# Patient Record
Sex: FEMALE | Race: WHITE | Employment: FULL TIME | URBAN - METROPOLITAN AREA
[De-identification: names, ages, dates, MRNs, and addresses within clinical notes are randomized per-mention and may not be internally consistent; named-entity substitution may affect disease eponyms.]

---

## 2018-08-06 ENCOUNTER — OFFICE VISIT (OUTPATIENT)
Dept: OBGYN CLINIC | Facility: CLINIC | Age: 28
End: 2018-08-06
Payer: COMMERCIAL

## 2018-08-06 VITALS
SYSTOLIC BLOOD PRESSURE: 112 MMHG | DIASTOLIC BLOOD PRESSURE: 70 MMHG | WEIGHT: 181 LBS | BODY MASS INDEX: 25.34 KG/M2 | HEIGHT: 71 IN | HEART RATE: 52 BPM | TEMPERATURE: 98 F | RESPIRATION RATE: 16 BRPM

## 2018-08-06 DIAGNOSIS — Z12.4 SCREENING FOR MALIGNANT NEOPLASM OF CERVIX: ICD-10-CM

## 2018-08-06 DIAGNOSIS — Z01.419 ENCOUNTER FOR ANNUAL ROUTINE GYNECOLOGICAL EXAMINATION: Primary | ICD-10-CM

## 2018-08-06 DIAGNOSIS — Z23 NEED FOR PROPHYLACTIC VACCINATION AND INOCULATION AGAINST CHOLERA ALONE: ICD-10-CM

## 2018-08-06 PROCEDURE — 90471 IMMUNIZATION ADMIN: CPT | Performed by: OBSTETRICS & GYNECOLOGY

## 2018-08-06 PROCEDURE — 88175 CYTOPATH C/V AUTO FLUID REDO: CPT | Performed by: OBSTETRICS & GYNECOLOGY

## 2018-08-06 PROCEDURE — 99385 PREV VISIT NEW AGE 18-39: CPT | Performed by: OBSTETRICS & GYNECOLOGY

## 2018-08-06 PROCEDURE — 90649 4VHPV VACCINE 3 DOSE IM: CPT | Performed by: OBSTETRICS & GYNECOLOGY

## 2018-08-06 NOTE — PROGRESS NOTES
HPI:   Bakari Mayer is a 29year old  who presents for an annual gynecological exam.    Menses: No LMP recorded. Patient is not currently having periods (Reason: IUD - Intrauterine Device).  Cycle length:  Irregular spotting  Flow:  Light. mirena sin symmetric  THYROID: No masses, no thyromegaly  BREASTS: Normal inspection, no dominant masses on palpation, no lymphadenopathy  CV: Regular rate and rhythm  LUNGS: Clear to auscultation bilaterally, good air entry throughout  ABD: Soft, nontender and not d exam.        Normal exam.  Pap done. Contraception: IUD  RTO 1 year or PRN.     Diagnoses and all orders for this visit:    Encounter for annual routine gynecological examination    Screening for malignant neoplasm of cervix  -     THINPREP PAP WITH HPV RE

## 2018-08-16 ENCOUNTER — TELEPHONE (OUTPATIENT)
Dept: OBGYN CLINIC | Facility: CLINIC | Age: 28
End: 2018-08-16

## 2018-09-05 ENCOUNTER — TELEPHONE (OUTPATIENT)
Dept: OBGYN CLINIC | Facility: CLINIC | Age: 28
End: 2018-09-05

## 2018-09-10 ENCOUNTER — TELEPHONE (OUTPATIENT)
Dept: OBGYN CLINIC | Facility: CLINIC | Age: 28
End: 2018-09-10

## 2018-09-10 NOTE — TELEPHONE ENCOUNTER
Reach out to patient to rescheduled appointment that was cancelled for colposcopy. No answer left message.

## 2018-09-29 ENCOUNTER — OFFICE VISIT (OUTPATIENT)
Dept: OBGYN CLINIC | Facility: CLINIC | Age: 28
End: 2018-09-29
Payer: COMMERCIAL

## 2018-09-29 VITALS
SYSTOLIC BLOOD PRESSURE: 104 MMHG | WEIGHT: 178 LBS | RESPIRATION RATE: 14 BRPM | BODY MASS INDEX: 24.92 KG/M2 | HEART RATE: 68 BPM | DIASTOLIC BLOOD PRESSURE: 78 MMHG | HEIGHT: 71 IN

## 2018-09-29 DIAGNOSIS — B96.89 BV (BACTERIAL VAGINOSIS): ICD-10-CM

## 2018-09-29 DIAGNOSIS — N89.8 VAGINAL DISCHARGE: Primary | ICD-10-CM

## 2018-09-29 DIAGNOSIS — N76.0 BV (BACTERIAL VAGINOSIS): ICD-10-CM

## 2018-09-29 DIAGNOSIS — N87.0 MILD DYSPLASIA OF CERVIX: ICD-10-CM

## 2018-09-29 PROCEDURE — 87660 TRICHOMONAS VAGIN DIR PROBE: CPT | Performed by: OBSTETRICS & GYNECOLOGY

## 2018-09-29 PROCEDURE — 99213 OFFICE O/P EST LOW 20 MIN: CPT | Performed by: OBSTETRICS & GYNECOLOGY

## 2018-09-29 PROCEDURE — 87510 GARDNER VAG DNA DIR PROBE: CPT | Performed by: OBSTETRICS & GYNECOLOGY

## 2018-09-29 PROCEDURE — 87480 CANDIDA DNA DIR PROBE: CPT | Performed by: OBSTETRICS & GYNECOLOGY

## 2018-09-29 RX ORDER — METRONIDAZOLE 500 MG/1
500 TABLET ORAL 2 TIMES DAILY
Qty: 14 TABLET | Refills: 0 | Status: SHIPPED | OUTPATIENT
Start: 2018-09-29 | End: 2018-10-06

## 2018-09-29 NOTE — PROGRESS NOTES
CHIEF COMPLAINT:   Patient presents with vaginal discharge with odor  HPI:   Nicolás Palmer is a 29year old  No LMP recorded (lmp unknown). Patient is not currently having periods (Reason: IUD - Intrauterine Device).  who presents with chief comp polyps  Presence of discharge that is consistent with bacterial vaginosis  IMPRESSION/PLAN:     BV and vaginal discharge: Affirm done. We will start Flagyl 500 mg 1 p.o. twice daily for 7 days. Recurrent BV discussed with patient. Etiology discussed.   P

## 2018-10-16 ENCOUNTER — TELEPHONE (OUTPATIENT)
Dept: OBGYN CLINIC | Facility: CLINIC | Age: 28
End: 2018-10-16

## 2018-10-16 NOTE — TELEPHONE ENCOUNTER
LM for to call office and schedule 2 Gardasil injection. She should have received 2nd injection on or about 9/6/18.

## 2019-01-04 ENCOUNTER — OFFICE VISIT (OUTPATIENT)
Dept: OBGYN CLINIC | Facility: CLINIC | Age: 29
End: 2019-01-04
Payer: COMMERCIAL

## 2019-01-04 VITALS
DIASTOLIC BLOOD PRESSURE: 70 MMHG | WEIGHT: 179 LBS | BODY MASS INDEX: 25.06 KG/M2 | RESPIRATION RATE: 12 BRPM | HEART RATE: 64 BPM | SYSTOLIC BLOOD PRESSURE: 102 MMHG | HEIGHT: 71 IN | TEMPERATURE: 99 F

## 2019-01-04 DIAGNOSIS — R87.612 PAPANICOLAOU SMEAR OF CERVIX WITH LOW GRADE SQUAMOUS INTRAEPITHELIAL LESION (LGSIL): Primary | ICD-10-CM

## 2019-01-04 PROCEDURE — 88305 TISSUE EXAM BY PATHOLOGIST: CPT | Performed by: OBSTETRICS & GYNECOLOGY

## 2019-01-04 PROCEDURE — 57454 BX/CURETT OF CERVIX W/SCOPE: CPT | Performed by: OBSTETRICS & GYNECOLOGY

## 2019-01-04 NOTE — PROGRESS NOTES
COLPOSCOPY:     29year old woman, , with an LMP of No LMP recorded. Patient is not currently having periods (Reason: IUD - Intrauterine Device). presents for colposcopy. The patient is not pregnant.  mirena  The patient presents for colposcopy due need for close follow-up to help reduce the progression to cervical cancer was stressed to the patient. Discussed that a brown, yellow or light red discharge is normal. No intercourse, tampons or baths for 4-5 days.  Call the office if any fevers, foul

## 2019-01-07 ENCOUNTER — OFFICE VISIT (OUTPATIENT)
Dept: FAMILY MEDICINE CLINIC | Facility: CLINIC | Age: 29
End: 2019-01-07
Payer: COMMERCIAL

## 2019-01-07 VITALS
RESPIRATION RATE: 14 BRPM | SYSTOLIC BLOOD PRESSURE: 126 MMHG | HEIGHT: 70.5 IN | WEIGHT: 179 LBS | DIASTOLIC BLOOD PRESSURE: 70 MMHG | BODY MASS INDEX: 25.34 KG/M2 | HEART RATE: 60 BPM

## 2019-01-07 DIAGNOSIS — M25.561 CHRONIC PAIN OF RIGHT KNEE: ICD-10-CM

## 2019-01-07 DIAGNOSIS — Z00.00 ROUTINE GENERAL MEDICAL EXAMINATION AT A HEALTH CARE FACILITY: Primary | ICD-10-CM

## 2019-01-07 DIAGNOSIS — M79.621 PAIN IN RIGHT AXILLA: ICD-10-CM

## 2019-01-07 DIAGNOSIS — M77.8 WRIST TENDONITIS: ICD-10-CM

## 2019-01-07 DIAGNOSIS — H69.82 ACUTE DYSFUNCTION OF LEFT EUSTACHIAN TUBE: ICD-10-CM

## 2019-01-07 DIAGNOSIS — G89.29 CHRONIC PAIN OF RIGHT KNEE: ICD-10-CM

## 2019-01-07 PROCEDURE — 99385 PREV VISIT NEW AGE 18-39: CPT | Performed by: FAMILY MEDICINE

## 2019-01-07 NOTE — PROGRESS NOTES
HPI:   Newman Castleman is a 29year old female who presents for a complete physical exam.  Last pap:  8/2018 - abnormal; colpo 1/2019 [facility].   Sees Dr. Mickey Robertson  Last mammogram:  n/a   Self exams:  yes  Menses:  Irregular due to Mirena  Contraception:  Mirena sleep apnea   • Heart Disorder Maternal Grandmother    • Other (Other) Brother         sleep apnea   • Diabetes Maternal Aunt       Social History:   Social History    Tobacco Use      Smoking status: Never Smoker      Smokeless tobacco: Never Used    Alco perform monthly. · Dexascan:  n/a. Recommended dietary calcium and daily Vit D supplements. · Labs:  /  · Colonoscopy:  n/a  · Vaccines recommended today:  none  · Recommended low fat diet and regular exercise.     · The patient is asked to return for CP

## 2019-01-16 ENCOUNTER — TELEPHONE (OUTPATIENT)
Dept: FAMILY MEDICINE CLINIC | Facility: CLINIC | Age: 29
End: 2019-01-16

## 2019-01-31 ENCOUNTER — TELEPHONE (OUTPATIENT)
Dept: FAMILY MEDICINE CLINIC | Facility: CLINIC | Age: 29
End: 2019-01-31

## 2019-01-31 DIAGNOSIS — M25.562 ACUTE PAIN OF LEFT KNEE: Primary | ICD-10-CM

## 2019-01-31 NOTE — TELEPHONE ENCOUNTER
Called and talked to patient gave results and referral name   Dr Malena Orourke  [address] [place] [address] Way  [place], [address]   Phone: [phone]

## 2019-01-31 NOTE — TELEPHONE ENCOUNTER
Received MRI of knee from [facility]. This shows thinning and fraying of the cartilage. Would benefit from ortho eval given duration of pain and failure of PT. Dr. Duglas biswas.   Pt needs to be sure she has disc of images from [facility] to bring to

## 2019-05-18 ENCOUNTER — OFFICE VISIT (OUTPATIENT)
Dept: OBGYN CLINIC | Facility: CLINIC | Age: 29
End: 2019-05-18
Payer: COMMERCIAL

## 2019-05-18 VITALS
SYSTOLIC BLOOD PRESSURE: 120 MMHG | WEIGHT: 182 LBS | HEIGHT: 71 IN | BODY MASS INDEX: 25.48 KG/M2 | DIASTOLIC BLOOD PRESSURE: 82 MMHG

## 2019-05-18 DIAGNOSIS — N64.4 BREAST PAIN: Primary | ICD-10-CM

## 2019-05-18 DIAGNOSIS — N60.11 FIBROCYSTIC BREAST CHANGES OF BOTH BREASTS: ICD-10-CM

## 2019-05-18 DIAGNOSIS — N60.12 FIBROCYSTIC BREAST CHANGES OF BOTH BREASTS: ICD-10-CM

## 2019-05-18 PROCEDURE — 99213 OFFICE O/P EST LOW 20 MIN: CPT | Performed by: OBSTETRICS & GYNECOLOGY

## 2019-05-18 NOTE — PROGRESS NOTES
Pain in right breast for 2 weeks. Pt reports it is getting a little bit better. Pt works out and plays sports, she also mentions she sleeps on that side as well.

## 2019-05-18 NOTE — PROGRESS NOTES
CHIEF COMPLAINT:   Patient presents with right breast pain for the past 2 weeks  HPI:   Federicoell Record is a 34year old  No LMP recorded. (Menstrual status: IUD - Intrauterine Device). who presents with right breast pain.   Patient states that over t next few weeks. She is to take ibuprofen 400 mg every 6 hours. If the discomfort persist after a week she is to call. Continue self breast exam.  If pain persist will order a breast ultrasound    2.  Fibrocystic breast changes of both breasts      3) rig

## 2019-06-11 ENCOUNTER — TELEPHONE (OUTPATIENT)
Dept: FAMILY MEDICINE CLINIC | Facility: CLINIC | Age: 29
End: 2019-06-11

## 2019-06-11 DIAGNOSIS — Z00.00 ROUTINE GENERAL MEDICAL EXAMINATION AT A HEALTH CARE FACILITY: Primary | ICD-10-CM

## 2019-06-11 NOTE — TELEPHONE ENCOUNTER
Please enter lab orders for the patient's upcoming physical appointment. Physical scheduled: 8/29/2019     Preferred lab: [facility]     The patient has been notified to complete fasting labs prior to their physical appointment.

## 2019-07-11 PROCEDURE — 88175 CYTOPATH C/V AUTO FLUID REDO: CPT | Performed by: OBSTETRICS & GYNECOLOGY

## 2019-08-26 ENCOUNTER — LAB ENCOUNTER (OUTPATIENT)
Dept: LAB | Age: 29
End: 2019-08-26
Attending: FAMILY MEDICINE
Payer: COMMERCIAL

## 2019-08-26 DIAGNOSIS — Z00.00 ROUTINE GENERAL MEDICAL EXAMINATION AT A HEALTH CARE FACILITY: ICD-10-CM

## 2019-08-26 LAB
ALBUMIN SERPL-MCNC: 4 G/DL (ref 3.4–5)
ALBUMIN/GLOB SERPL: 1.3 {RATIO} (ref 1–2)
ALP LIVER SERPL-CCNC: 65 U/L (ref 37–98)
ALT SERPL-CCNC: 27 U/L (ref 13–56)
ANION GAP SERPL CALC-SCNC: 7 MMOL/L (ref 0–18)
AST SERPL-CCNC: 13 U/L (ref 15–37)
BILIRUB SERPL-MCNC: 1 MG/DL (ref 0.1–2)
BUN BLD-MCNC: 13 MG/DL (ref 7–18)
BUN/CREAT SERPL: 17.3 (ref 10–20)
CALCIUM BLD-MCNC: 8.8 MG/DL (ref 8.5–10.1)
CHLORIDE SERPL-SCNC: 107 MMOL/L (ref 98–112)
CHOLEST SMN-MCNC: 142 MG/DL (ref ?–200)
CO2 SERPL-SCNC: 27 MMOL/L (ref 21–32)
CREAT BLD-MCNC: 0.75 MG/DL (ref 0.55–1.02)
DEPRECATED RDW RBC AUTO: 42.4 FL (ref 35.1–46.3)
ERYTHROCYTE [DISTWIDTH] IN BLOOD BY AUTOMATED COUNT: 12.3 % (ref 11–15)
GLOBULIN PLAS-MCNC: 3.2 G/DL (ref 2.8–4.4)
GLUCOSE BLD-MCNC: 82 MG/DL (ref 70–99)
HCT VFR BLD AUTO: 43.5 % (ref 35–48)
HDLC SERPL-MCNC: 47 MG/DL (ref 40–59)
HGB BLD-MCNC: 15.4 G/DL (ref 12–16)
LDLC SERPL CALC-MCNC: 72 MG/DL (ref ?–100)
M PROTEIN MFR SERPL ELPH: 7.2 G/DL (ref 6.4–8.2)
MCH RBC QN AUTO: 32.6 PG (ref 26–34)
MCHC RBC AUTO-ENTMCNC: 35.4 G/DL (ref 31–37)
MCV RBC AUTO: 92.2 FL (ref 80–100)
NONHDLC SERPL-MCNC: 95 MG/DL (ref ?–130)
OSMOLALITY SERPL CALC.SUM OF ELEC: 291 MOSM/KG (ref 275–295)
PLATELET # BLD AUTO: 228 10(3)UL (ref 150–450)
POTASSIUM SERPL-SCNC: 4 MMOL/L (ref 3.5–5.1)
RBC # BLD AUTO: 4.72 X10(6)UL (ref 3.8–5.3)
SODIUM SERPL-SCNC: 141 MMOL/L (ref 136–145)
TRIGL SERPL-MCNC: 115 MG/DL (ref 30–149)
TSI SER-ACNC: 3.34 MIU/ML (ref 0.36–3.74)
VLDLC SERPL CALC-MCNC: 23 MG/DL (ref 0–30)
WBC # BLD AUTO: 6.4 X10(3) UL (ref 4–11)

## 2019-08-26 PROCEDURE — 80061 LIPID PANEL: CPT | Performed by: FAMILY MEDICINE

## 2019-08-26 PROCEDURE — 36415 COLL VENOUS BLD VENIPUNCTURE: CPT | Performed by: FAMILY MEDICINE

## 2019-08-26 PROCEDURE — 80050 GENERAL HEALTH PANEL: CPT | Performed by: FAMILY MEDICINE

## 2019-08-29 ENCOUNTER — OFFICE VISIT (OUTPATIENT)
Dept: FAMILY MEDICINE CLINIC | Facility: CLINIC | Age: 29
End: 2019-08-29
Payer: COMMERCIAL

## 2019-08-29 VITALS
RESPIRATION RATE: 16 BRPM | DIASTOLIC BLOOD PRESSURE: 68 MMHG | TEMPERATURE: 98 F | WEIGHT: 184 LBS | HEIGHT: 71 IN | BODY MASS INDEX: 25.76 KG/M2 | SYSTOLIC BLOOD PRESSURE: 98 MMHG | HEART RATE: 60 BPM

## 2019-08-29 DIAGNOSIS — Z71.2 ENCOUNTER TO DISCUSS TEST RESULTS: ICD-10-CM

## 2019-08-29 DIAGNOSIS — T78.40XA ALLERGIC REACTION, INITIAL ENCOUNTER: Primary | ICD-10-CM

## 2019-08-29 DIAGNOSIS — Z23 NEED FOR TDAP VACCINATION: ICD-10-CM

## 2019-08-29 PROCEDURE — 99214 OFFICE O/P EST MOD 30 MIN: CPT | Performed by: FAMILY MEDICINE

## 2019-08-29 PROCEDURE — 90471 IMMUNIZATION ADMIN: CPT | Performed by: FAMILY MEDICINE

## 2019-08-29 PROCEDURE — 90715 TDAP VACCINE 7 YRS/> IM: CPT | Performed by: FAMILY MEDICINE

## 2019-08-29 RX ORDER — AMOXICILLIN 500 MG/1
CAPSULE ORAL
Refills: 0 | COMMUNITY
Start: 2019-08-20 | End: 2019-11-12

## 2019-08-29 NOTE — PROGRESS NOTES
Eunice Ham is a 34year old female. HPI:   Patient presents for f/u on labs results. Needs work form completed. Requests allergy testing - concern for allergy to ragweed, milk and cats.     Since last visit, has had pilar cysts removed  May need No prescriptions requested or ordered in this encounter     Imaging & Consults:  TETANUS, DIPHTHERIA TOXOIDS AND ACELLULAR PERTUSIS VACCINE (TDAP), >7 YEARS, IM USE

## 2019-11-12 ENCOUNTER — OFFICE VISIT (OUTPATIENT)
Dept: FAMILY MEDICINE CLINIC | Facility: CLINIC | Age: 29
End: 2019-11-12
Payer: COMMERCIAL

## 2019-11-12 VITALS
SYSTOLIC BLOOD PRESSURE: 104 MMHG | WEIGHT: 191.63 LBS | HEIGHT: 71 IN | DIASTOLIC BLOOD PRESSURE: 60 MMHG | RESPIRATION RATE: 18 BRPM | TEMPERATURE: 98 F | HEART RATE: 82 BPM | BODY MASS INDEX: 26.83 KG/M2

## 2019-11-12 DIAGNOSIS — M26.609 TMJ DYSFUNCTION: Primary | ICD-10-CM

## 2019-11-12 DIAGNOSIS — R29.898 POPPING OF BOTH TEMPOROMANDIBULAR JOINTS ON OPENING OF JAW: ICD-10-CM

## 2019-11-12 PROCEDURE — 99213 OFFICE O/P EST LOW 20 MIN: CPT | Performed by: FAMILY MEDICINE

## 2020-08-19 ENCOUNTER — TELEMEDICINE (OUTPATIENT)
Dept: TELEHEALTH | Age: 30
End: 2020-08-19

## 2020-08-19 DIAGNOSIS — Z20.822 CLOSE EXPOSURE TO COVID-19 VIRUS: ICD-10-CM

## 2020-08-19 DIAGNOSIS — R19.7 DIARRHEA, UNSPECIFIED TYPE: Primary | ICD-10-CM

## 2020-08-19 PROCEDURE — 99202 OFFICE O/P NEW SF 15 MIN: CPT | Performed by: NURSE PRACTITIONER

## 2020-08-20 ENCOUNTER — LAB ENCOUNTER (OUTPATIENT)
Dept: LAB | Facility: HOSPITAL | Age: 30
End: 2020-08-20
Attending: NURSE PRACTITIONER
Payer: COMMERCIAL

## 2020-08-20 DIAGNOSIS — Z20.822 CLOSE EXPOSURE TO COVID-19 VIRUS: ICD-10-CM

## 2020-08-20 DIAGNOSIS — R19.7 DIARRHEA, UNSPECIFIED TYPE: ICD-10-CM

## 2020-08-20 NOTE — PATIENT INSTRUCTIONS
Coronavirus Disease 2019 (COVID-19): Caring for Yourself or Others  If you or a household member have symptoms of COVID-19, follow the guidelines below for preventing spread of the virus, and managing symptoms.   If you think you have COVID-19 symptoms  · · Follow all instructions the healthcare staff give you. If you have been diagnosed with COVID-19  · Stay home and start self-isolation. Don’t leave your home unless you need to get medical care. Don't go to work, school, or public areas.  Don't use publ Current treatment is mainly aimed at helping your body while it fights the virus. This is known as supportive care. Take care of yourself at home by:  · Getting rest. This helps your body fight the illness. · Staying hydrated.  Drinking liquids is the best · Clean home surfaces often with disinfectant. This includes phones, kitchen counters, fridge door handle, bathroom surfaces, and others. · Don’t let anyone share household items with the sick person.  This includes eating and drinking tools, towels, sheet 3. You have 2 negative COVID-19 nose-throat swabs that were collected at least 24 hours apart. If no tests are available, your healthcare provider will likely tell you to follow the isolation instructions for normally healthy people.  Follow your provider's · Abdominal pain and cramping  · Nausea and vomiting  · Loss of bowel control  · Fever and chills  · Bloody stools  In some cases, antibiotics may help to treat diarrhea.  You may have a stool sample test. This is done to see what is causing your diarrhea, · Remember that washing with soap and water and using alcohol-based  is the best way to prevent the spread of infection. Dry your hands with a single use towel (like a paper towel). · Clean the toilet after each use.   · Wash your hands before eat · Hot cereal, plain toast, bread, rolls, or crackers  · Plain noodles, rice, mashed potatoes, chicken noodle soup, or rice soup  · Unsweetened canned fruit (no pineapple)  · Bananas  As you recover:  · Limit fat intake to less than 15 grams per day.  Don’t © 8192-5406 [employer] [address]. [address] [facility], [address] [place]. All rights reserved. This information is not intended as a substitute for professional medical care. Always follow your healthcare professional's instructions.         [employer] last reviewed this educational content on 9/1/2019  © 0857-6081 [employer]. [address] [facility], [facility] [address] [place]. All rights reserved. This information is not intended as a substitute for professional medical care.  Always follo

## 2020-08-20 NOTE — PROGRESS NOTES
Virtual/Telephone Check-In    Radha Ramos verbally consents to a Virtual/Telephone Check-In service on 08/19/20. Patient has been referred to the [facility] website at [email or web address] to review the yearly Consent to Treat document.   Patient Alicia LYMPH:  Denies lymphadenopathy  NEURO: Denies headaches or lightheadedness      EXAM:   There were no vitals taken for this visit.   GENERAL: well developed, well nourished,in no apparent distress  Eyes: Clear  LUNGS: Pt is speaking in full sentences Breat · If you need to cough or sneeze, do it into a tissue. Then throw the tissue into the trash. If you don't have tissues, cough or sneeze into the bend of your elbow. · Don’t share food or personal items with people in your household.  This includes items li · Wear a face mask. This is to protect other people from your germs. If you are not able to wear a mask, your caregivers should. During a public health emergency, medical face masks may be reserved for healthcare workers.  You may need to make a cloth face If you've been in the hospital for suspected or confirmed COVID-19 and now are home, follow all of your healthcare team's instructions. This will include when it's OK to stop self-isolation.  You may also get instructions on position changes to help your br 1. You have had no fever for at least 72 hours. This means no fever without medicine that reduces fever, such as acetaminophen, for at least 72 hours. 2. Your symptoms are better, such as cough or trouble breathing.   3. It has been at least 10 days since Going home from the hospital  If you were diagnosed with COVID-19 and were recently discharged from the hospital:  · Follow the instructions above for self-care and isolation. · Follow the hospital healthcare team’s specific instructions.   · Ask questions Diarrhea can cause dehydration. This is the loss of too much water and other fluids from the body. When this occurs, body fluid must be replaced. This can be done with oral rehydration solutions.  Oral rehydration solutions are available at [facility] and  · Wash your hands after using cutting boards, countertops, and knives that have been in contact with raw foods. · Wash and then peel fruits and vegetables. · Keep uncooked meats away from cooked and ready-to-eat foods.   · Use a food thermometer when cook · Limit fiber. Don’t eat raw or cooked vegetables, fresh fruits except bananas, or bran cereals. · Limit caffeine and chocolate. · Limit dairy. · Don’t use spices or seasonings except salt.   · Go back to your normal diet over time, as you feel better an · Drinks. Plain water, sport drinks like electrolyte solutions, drinks without caffeine, mineral water (plain or flavored), and clear fruit juices. Don't have drinks with caffeine or citrus juices.  This is because they are high in acid and can irritate you I conducted a telehealth visit with Felicita Eric today, 08/19/20, which was completed using two-way, real-time interactive audio and video communication.  This has been done in good katerin to provide continuity of care in the best interest of the provider-

## 2020-08-21 LAB — SARS-COV-2 RNA RESP QL NAA+PROBE: NOT DETECTED

## 2020-08-24 ENCOUNTER — MED REC SCAN ONLY (OUTPATIENT)
Dept: FAMILY MEDICINE CLINIC | Facility: CLINIC | Age: 30
End: 2020-08-24

## 2020-08-29 ENCOUNTER — TELEPHONE (OUTPATIENT)
Dept: FAMILY MEDICINE CLINIC | Facility: CLINIC | Age: 30
End: 2020-08-29

## 2020-08-29 DIAGNOSIS — Z00.00 LABORATORY EXAMINATION ORDERED AS PART OF A COMPLETE PHYSICAL EXAMINATION: Primary | ICD-10-CM

## 2020-08-29 NOTE — TELEPHONE ENCOUNTER
Please enter lab orders for the patient's upcoming physical appointment. Physical scheduled: Your appointments     Date & Time Appointment Department [facility])    Sep 01, 2020 11:40 AM CDT Adult Physical with Halina Baer  [address]  ([address])    PLEASE NOTE - Most insurances allow a Complete Physical once every 366 days. Please schedule accordingly. Please arrive 15 minutes prior to your scheduled appointment. Please also bring your Insurance card, Photo ID, and your medication bottles or a list of your current medication. If you no longer require this appointment, please contact your physician office to cancel. [address] [facility] HEART [facility],  [address]  [place] [address] [phone]         Preferred lab: [facility])     The patient has been notified to complete fasting labs prior to their physical appointment.

## 2020-09-05 ENCOUNTER — TELEMEDICINE (OUTPATIENT)
Dept: TELEHEALTH | Age: 30
End: 2020-09-05

## 2020-09-05 DIAGNOSIS — J02.9 ACUTE PHARYNGITIS, UNSPECIFIED ETIOLOGY: Primary | ICD-10-CM

## 2020-09-05 PROCEDURE — 99213 OFFICE O/P EST LOW 20 MIN: CPT | Performed by: NURSE PRACTITIONER

## 2020-09-05 RX ORDER — AMOXICILLIN 875 MG/1
875 TABLET, COATED ORAL 2 TIMES DAILY
Qty: 20 TABLET | Refills: 0 | Status: SHIPPED | OUTPATIENT
Start: 2020-09-05 | End: 2020-09-15

## 2020-09-05 NOTE — PROGRESS NOTES
This is a telemedicine visit with live, interactive video and audio. Patient understands and accepts financial responsibility for any deductible, co-insurance and/or co-pays associated with this service.     SUBJECTIVE  \"I have a sore throat\"    HISTO

## 2020-09-23 ENCOUNTER — TELEPHONE (OUTPATIENT)
Dept: FAMILY MEDICINE CLINIC | Facility: CLINIC | Age: 30
End: 2020-09-23

## 2020-09-23 NOTE — TELEPHONE ENCOUNTER
Patient scheduled for 9/29/20 with Dr. Drea Mendoza thru My chart but has this message:    I have been having intermittent high heart rate issues. Ok to wait to be seen?

## 2020-09-29 NOTE — PATIENT INSTRUCTIONS
5-4-3-2-1 Coping Technique for Anxiety  By: MARY JANE Wilkerson    Anxiety is something most of us have experienced at least once in our life.  Public speaking, performance reviews, and new job responsibilities are just some of the work-related situations that

## 2020-09-29 NOTE — PROGRESS NOTES
Chief Complaint:  Patient presents with:  Rapid Heart Beat: x 2 months, thinks it its related to anxiety, HR reading 130bpm     HPI:  This is a 27year old female patient presenting for Rapid Heart Beat (x 2 months, thinks it its related to anxiety, HR zohaib Outpatient Medications   Medication Sig Dispense Refill   • escitalopram 10 MG Oral Tab Take 1 tablet (10 mg total) by mouth daily. 30 tablet 0   • clonazePAM 0.5 MG Oral Tab Take 1 tablet (0.5 mg total) by mouth 2 (two) times daily as needed for Anxiety. consider.   -     escitalopram 10 MG Oral Tab; Take 1 tablet (10 mg total) by mouth daily. -     clonazePAM 0.5 MG Oral Tab;  Take 1 tablet (0.5 mg total) by mouth 2 (two) times daily as needed for Anxiety.  -     OP REFERRAL TO [facility]    Office visit la

## 2020-10-24 ENCOUNTER — LAB ENCOUNTER (OUTPATIENT)
Dept: LAB | Facility: REFERENCE LAB | Age: 30
End: 2020-10-24
Attending: FAMILY MEDICINE
Payer: COMMERCIAL

## 2020-10-24 DIAGNOSIS — Z00.00 LABORATORY EXAMINATION ORDERED AS PART OF A COMPLETE PHYSICAL EXAMINATION: ICD-10-CM

## 2020-10-24 PROCEDURE — 85025 COMPLETE CBC W/AUTO DIFF WBC: CPT

## 2020-10-24 PROCEDURE — 80053 COMPREHEN METABOLIC PANEL: CPT

## 2020-10-24 PROCEDURE — 36415 COLL VENOUS BLD VENIPUNCTURE: CPT

## 2020-10-24 PROCEDURE — 84443 ASSAY THYROID STIM HORMONE: CPT

## 2020-10-24 PROCEDURE — 80061 LIPID PANEL: CPT

## 2020-11-03 ENCOUNTER — OFFICE VISIT (OUTPATIENT)
Dept: FAMILY MEDICINE CLINIC | Facility: CLINIC | Age: 30
End: 2020-11-03
Payer: COMMERCIAL

## 2020-11-03 VITALS
BODY MASS INDEX: 25.06 KG/M2 | HEART RATE: 70 BPM | DIASTOLIC BLOOD PRESSURE: 60 MMHG | RESPIRATION RATE: 16 BRPM | SYSTOLIC BLOOD PRESSURE: 100 MMHG | TEMPERATURE: 98 F | HEIGHT: 71 IN | WEIGHT: 179 LBS

## 2020-11-03 DIAGNOSIS — Z12.9 SCREENING FOR CANCER: ICD-10-CM

## 2020-11-03 DIAGNOSIS — Z01.419 WELL WOMAN EXAM WITH ROUTINE GYNECOLOGICAL EXAM: Primary | ICD-10-CM

## 2020-11-03 DIAGNOSIS — H93.13 TINNITUS OF BOTH EARS: ICD-10-CM

## 2020-11-03 PROCEDURE — 3074F SYST BP LT 130 MM HG: CPT | Performed by: FAMILY MEDICINE

## 2020-11-03 PROCEDURE — 3008F BODY MASS INDEX DOCD: CPT | Performed by: FAMILY MEDICINE

## 2020-11-03 PROCEDURE — 3078F DIAST BP <80 MM HG: CPT | Performed by: FAMILY MEDICINE

## 2020-11-03 PROCEDURE — 88175 CYTOPATH C/V AUTO FLUID REDO: CPT | Performed by: FAMILY MEDICINE

## 2020-11-03 PROCEDURE — 87624 HPV HI-RISK TYP POOLED RSLT: CPT | Performed by: FAMILY MEDICINE

## 2020-11-03 PROCEDURE — 99395 PREV VISIT EST AGE 18-39: CPT | Performed by: FAMILY MEDICINE

## 2020-11-03 NOTE — PROGRESS NOTES
SUBJECTIVE:  Patient presents with:  Physical: WWE no pap     HPI:  Notes that she is still having ringing in her ears. Associated this with starting lexapro. But has not improved despite stopping lexapro after 3 days.  Does have hx of ET tubes, with troy Other (Other) Brother         sleep apnea   • Diabetes Maternal Aunt       Social History    Tobacco Use      Smoking status: Never Smoker      Smokeless tobacco: Never Used    Alcohol use:  Yes      Alcohol/week: 1.0 - 5.0 standard drinks      Types: 1 - 5 left TM. If no improvement, consider patient return to see ENT. Well woman exam with routine gynecological exam  Vaccines: Indicated today: UTD  Obesity screening: Body mass index is 24.97 kg/m².  Discussed healthy diet and exercise in detail today  Hyp

## 2021-03-14 ENCOUNTER — LAB ENCOUNTER (OUTPATIENT)
Dept: LAB | Facility: HOSPITAL | Age: 31
End: 2021-03-14
Attending: ORTHOPAEDIC SURGERY
Payer: COMMERCIAL

## 2021-03-14 DIAGNOSIS — Z01.818 PREOP TESTING: ICD-10-CM

## 2021-03-14 LAB — SARS-COV-2 RNA RESP QL NAA+PROBE: NOT DETECTED

## 2021-03-14 RX ORDER — ROPIVACAINE HYDROCHLORIDE 2 MG/ML
10 INJECTION, SOLUTION EPIDURAL; INFILTRATION; PERINEURAL ONCE
Status: DISCONTINUED | OUTPATIENT
Start: 2021-03-16 | End: 2021-03-16 | Stop reason: HOSPADM

## 2021-03-14 RX ORDER — LIDOCAINE HYDROCHLORIDE AND EPINEPHRINE 10; 10 MG/ML; UG/ML
10 INJECTION, SOLUTION INFILTRATION; PERINEURAL ONCE
Status: DISCONTINUED | OUTPATIENT
Start: 2021-03-16 | End: 2021-03-16 | Stop reason: HOSPADM

## 2021-03-15 RX ORDER — CEFAZOLIN SODIUM/WATER 2 G/20 ML
2 SYRINGE (ML) INTRAVENOUS ONCE
Status: CANCELLED | OUTPATIENT
Start: 2021-03-15

## 2021-03-15 RX ORDER — FLUTICASONE PROPIONATE 50 MCG
1 SPRAY, SUSPENSION (ML) NASAL AS NEEDED
COMMUNITY

## 2021-03-16 ENCOUNTER — ANESTHESIA EVENT (OUTPATIENT)
Dept: SURGERY | Facility: HOSPITAL | Age: 31
End: 2021-03-16
Payer: COMMERCIAL

## 2021-03-16 ENCOUNTER — ANESTHESIA (OUTPATIENT)
Dept: SURGERY | Facility: HOSPITAL | Age: 31
End: 2021-03-16
Payer: COMMERCIAL

## 2021-03-16 ENCOUNTER — HOSPITAL ENCOUNTER (OUTPATIENT)
Facility: HOSPITAL | Age: 31
Setting detail: HOSPITAL OUTPATIENT SURGERY
Discharge: HOME | End: 2021-03-16
Attending: ORTHOPAEDIC SURGERY | Admitting: ORTHOPAEDIC SURGERY
Payer: COMMERCIAL

## 2021-03-16 VITALS
HEART RATE: 64 BPM | BODY MASS INDEX: 24.5 KG/M2 | TEMPERATURE: 97 F | WEIGHT: 175 LBS | RESPIRATION RATE: 16 BRPM | HEIGHT: 71 IN | OXYGEN SATURATION: 100 % | DIASTOLIC BLOOD PRESSURE: 72 MMHG | SYSTOLIC BLOOD PRESSURE: 118 MMHG

## 2021-03-16 DIAGNOSIS — Z01.818 PREOP TESTING: Primary | ICD-10-CM

## 2021-03-16 LAB — B-HCG UR QL: NEGATIVE

## 2021-03-16 PROCEDURE — 64447 NJX AA&/STRD FEMORAL NRV IMG: CPT | Performed by: ORTHOPAEDIC SURGERY

## 2021-03-16 PROCEDURE — 76942 ECHO GUIDE FOR BIOPSY: CPT | Performed by: ANESTHESIOLOGY

## 2021-03-16 PROCEDURE — 76942 ECHO GUIDE FOR BIOPSY: CPT | Performed by: ORTHOPAEDIC SURGERY

## 2021-03-16 PROCEDURE — 0SUC07Z SUPPLEMENT RIGHT KNEE JOINT WITH AUTOLOGOUS TISSUE SUBSTITUTE, OPEN APPROACH: ICD-10-PCS | Performed by: ORTHOPAEDIC SURGERY

## 2021-03-16 PROCEDURE — 3E0T3BZ INTRODUCTION OF ANESTHETIC AGENT INTO PERIPHERAL NERVES AND PLEXI, PERCUTANEOUS APPROACH: ICD-10-PCS | Performed by: ANESTHESIOLOGY

## 2021-03-16 PROCEDURE — 94010 BREATHING CAPACITY TEST: CPT | Performed by: ORTHOPAEDIC SURGERY

## 2021-03-16 PROCEDURE — 81025 URINE PREGNANCY TEST: CPT

## 2021-03-16 PROCEDURE — 0L8Q0ZZ DIVISION OF RIGHT KNEE TENDON, OPEN APPROACH: ICD-10-PCS | Performed by: ORTHOPAEDIC SURGERY

## 2021-03-16 RX ORDER — ONDANSETRON 2 MG/ML
INJECTION INTRAMUSCULAR; INTRAVENOUS AS NEEDED
Status: DISCONTINUED | OUTPATIENT
Start: 2021-03-16 | End: 2021-03-16 | Stop reason: SURG

## 2021-03-16 RX ORDER — DEXAMETHASONE SODIUM PHOSPHATE 10 MG/ML
INJECTION, SOLUTION INTRAMUSCULAR; INTRAVENOUS
Status: COMPLETED | OUTPATIENT
Start: 2021-03-16 | End: 2021-03-16

## 2021-03-16 RX ORDER — ONDANSETRON 2 MG/ML
4 INJECTION INTRAMUSCULAR; INTRAVENOUS ONCE AS NEEDED
Status: DISCONTINUED | OUTPATIENT
Start: 2021-03-16 | End: 2021-03-16

## 2021-03-16 RX ORDER — CELECOXIB 200 MG/1
200 CAPSULE ORAL ONCE
Status: COMPLETED | OUTPATIENT
Start: 2021-03-16 | End: 2021-03-16

## 2021-03-16 RX ORDER — ACETAMINOPHEN 500 MG
1000 TABLET ORAL ONCE
Status: COMPLETED | OUTPATIENT
Start: 2021-03-16 | End: 2021-03-16

## 2021-03-16 RX ORDER — MORPHINE SULFATE 4 MG/ML
4 INJECTION, SOLUTION INTRAMUSCULAR; INTRAVENOUS EVERY 10 MIN PRN
Status: DISCONTINUED | OUTPATIENT
Start: 2021-03-16 | End: 2021-03-16

## 2021-03-16 RX ORDER — ROPIVACAINE HYDROCHLORIDE 5 MG/ML
INJECTION, SOLUTION EPIDURAL; INFILTRATION; PERINEURAL
Status: COMPLETED | OUTPATIENT
Start: 2021-03-16 | End: 2021-03-16

## 2021-03-16 RX ORDER — MIDAZOLAM HYDROCHLORIDE 1 MG/ML
INJECTION INTRAMUSCULAR; INTRAVENOUS
Status: COMPLETED | OUTPATIENT
Start: 2021-03-16 | End: 2021-03-16

## 2021-03-16 RX ORDER — DEXAMETHASONE SODIUM PHOSPHATE 4 MG/ML
VIAL (ML) INJECTION AS NEEDED
Status: DISCONTINUED | OUTPATIENT
Start: 2021-03-16 | End: 2021-03-16 | Stop reason: SURG

## 2021-03-16 RX ORDER — GABAPENTIN 300 MG/1
300 CAPSULE ORAL ONCE
Status: COMPLETED | OUTPATIENT
Start: 2021-03-16 | End: 2021-03-16

## 2021-03-16 RX ORDER — LIDOCAINE HYDROCHLORIDE 10 MG/ML
INJECTION, SOLUTION EPIDURAL; INFILTRATION; INTRACAUDAL; PERINEURAL AS NEEDED
Status: DISCONTINUED | OUTPATIENT
Start: 2021-03-16 | End: 2021-03-16 | Stop reason: SURG

## 2021-03-16 RX ORDER — SODIUM CHLORIDE, SODIUM LACTATE, POTASSIUM CHLORIDE, CALCIUM CHLORIDE 600; 310; 30; 20 MG/100ML; MG/100ML; MG/100ML; MG/100ML
INJECTION, SOLUTION INTRAVENOUS CONTINUOUS
Status: DISCONTINUED | OUTPATIENT
Start: 2021-03-16 | End: 2021-03-16

## 2021-03-16 RX ORDER — HYDROCODONE BITARTRATE AND ACETAMINOPHEN 5; 325 MG/1; MG/1
1 TABLET ORAL AS NEEDED
Status: DISCONTINUED | OUTPATIENT
Start: 2021-03-16 | End: 2021-03-16

## 2021-03-16 RX ORDER — CEFAZOLIN SODIUM/WATER 2 G/20 ML
SYRINGE (ML) INTRAVENOUS AS NEEDED
Status: DISCONTINUED | OUTPATIENT
Start: 2021-03-16 | End: 2021-03-16 | Stop reason: SURG

## 2021-03-16 RX ORDER — SODIUM CHLORIDE 0.9 % (FLUSH) 0.9 %
2 SYRINGE (ML) INJECTION EVERY 8 HOURS
Status: DISCONTINUED | OUTPATIENT
Start: 2021-03-16 | End: 2021-03-16 | Stop reason: HOSPADM

## 2021-03-16 RX ORDER — SODIUM CHLORIDE 0.9 % (FLUSH) 0.9 %
2 SYRINGE (ML) INJECTION AS NEEDED
Status: DISCONTINUED | OUTPATIENT
Start: 2021-03-16 | End: 2021-03-16 | Stop reason: HOSPADM

## 2021-03-16 RX ORDER — PROCHLORPERAZINE EDISYLATE 5 MG/ML
5 INJECTION INTRAMUSCULAR; INTRAVENOUS ONCE AS NEEDED
Status: DISCONTINUED | OUTPATIENT
Start: 2021-03-16 | End: 2021-03-16

## 2021-03-16 RX ORDER — HALOPERIDOL 5 MG/ML
0.25 INJECTION INTRAMUSCULAR ONCE AS NEEDED
Status: DISCONTINUED | OUTPATIENT
Start: 2021-03-16 | End: 2021-03-16

## 2021-03-16 RX ORDER — NALOXONE HYDROCHLORIDE 0.4 MG/ML
80 INJECTION, SOLUTION INTRAMUSCULAR; INTRAVENOUS; SUBCUTANEOUS AS NEEDED
Status: DISCONTINUED | OUTPATIENT
Start: 2021-03-16 | End: 2021-03-16

## 2021-03-16 RX ORDER — ROPIVACAINE HYDROCHLORIDE 2 MG/ML
INJECTION, SOLUTION EPIDURAL; INFILTRATION; PERINEURAL AS NEEDED
Status: DISCONTINUED | OUTPATIENT
Start: 2021-03-16 | End: 2021-03-16 | Stop reason: HOSPADM

## 2021-03-16 RX ORDER — MORPHINE SULFATE 4 MG/ML
2 INJECTION, SOLUTION INTRAMUSCULAR; INTRAVENOUS EVERY 10 MIN PRN
Status: DISCONTINUED | OUTPATIENT
Start: 2021-03-16 | End: 2021-03-16

## 2021-03-16 RX ORDER — LIDOCAINE HYDROCHLORIDE 10 MG/ML
INJECTION, SOLUTION INFILTRATION; PERINEURAL
Status: COMPLETED | OUTPATIENT
Start: 2021-03-16 | End: 2021-03-16

## 2021-03-16 RX ORDER — HYDROMORPHONE HYDROCHLORIDE 1 MG/ML
0.4 INJECTION, SOLUTION INTRAMUSCULAR; INTRAVENOUS; SUBCUTANEOUS EVERY 5 MIN PRN
Status: DISCONTINUED | OUTPATIENT
Start: 2021-03-16 | End: 2021-03-16

## 2021-03-16 RX ORDER — HYDROMORPHONE HYDROCHLORIDE 1 MG/ML
0.2 INJECTION, SOLUTION INTRAMUSCULAR; INTRAVENOUS; SUBCUTANEOUS EVERY 5 MIN PRN
Status: DISCONTINUED | OUTPATIENT
Start: 2021-03-16 | End: 2021-03-16

## 2021-03-16 RX ORDER — OXYCODONE HCL 10 MG/1
10 TABLET, FILM COATED, EXTENDED RELEASE ORAL EVERY 12 HOURS
Status: DISCONTINUED | OUTPATIENT
Start: 2021-03-16 | End: 2021-03-16

## 2021-03-16 RX ORDER — ONDANSETRON 2 MG/ML
4 INJECTION INTRAMUSCULAR; INTRAVENOUS EVERY 6 HOURS PRN
Status: CANCELLED | OUTPATIENT
Start: 2021-03-16

## 2021-03-16 RX ORDER — MORPHINE SULFATE 10 MG/ML
6 INJECTION, SOLUTION INTRAMUSCULAR; INTRAVENOUS EVERY 10 MIN PRN
Status: DISCONTINUED | OUTPATIENT
Start: 2021-03-16 | End: 2021-03-16

## 2021-03-16 RX ORDER — HYDROCODONE BITARTRATE AND ACETAMINOPHEN 5; 325 MG/1; MG/1
2 TABLET ORAL AS NEEDED
Status: DISCONTINUED | OUTPATIENT
Start: 2021-03-16 | End: 2021-03-16

## 2021-03-16 RX ORDER — OXYCODONE HYDROCHLORIDE AND ACETAMINOPHEN 5; 325 MG/1; MG/1
1 TABLET ORAL EVERY 4 HOURS PRN
Status: DISCONTINUED | OUTPATIENT
Start: 2021-03-16 | End: 2021-03-16

## 2021-03-16 RX ORDER — LIDOCAINE HYDROCHLORIDE AND EPINEPHRINE 10; 10 MG/ML; UG/ML
INJECTION, SOLUTION INFILTRATION; PERINEURAL AS NEEDED
Status: DISCONTINUED | OUTPATIENT
Start: 2021-03-16 | End: 2021-03-16 | Stop reason: HOSPADM

## 2021-03-16 RX ORDER — CEFAZOLIN SODIUM/WATER 2 G/20 ML
2 SYRINGE (ML) INTRAVENOUS ONCE
Status: DISCONTINUED | OUTPATIENT
Start: 2021-03-16 | End: 2021-03-16 | Stop reason: HOSPADM

## 2021-03-16 RX ORDER — HYDROMORPHONE HYDROCHLORIDE 1 MG/ML
0.6 INJECTION, SOLUTION INTRAMUSCULAR; INTRAVENOUS; SUBCUTANEOUS EVERY 5 MIN PRN
Status: DISCONTINUED | OUTPATIENT
Start: 2021-03-16 | End: 2021-03-16

## 2021-03-16 RX ADMIN — ONDANSETRON 4 MG: 2 INJECTION INTRAMUSCULAR; INTRAVENOUS at 07:25:00

## 2021-03-16 RX ADMIN — DEXAMETHASONE SODIUM PHOSPHATE 4 MG: 10 INJECTION, SOLUTION INTRAMUSCULAR; INTRAVENOUS at 06:55:00

## 2021-03-16 RX ADMIN — ROPIVACAINE HYDROCHLORIDE 30 ML: 5 INJECTION, SOLUTION EPIDURAL; INFILTRATION; PERINEURAL at 06:55:00

## 2021-03-16 RX ADMIN — LIDOCAINE HYDROCHLORIDE 1 ML: 10 INJECTION, SOLUTION INFILTRATION; PERINEURAL at 06:55:00

## 2021-03-16 RX ADMIN — LIDOCAINE HYDROCHLORIDE 50 MG: 10 INJECTION, SOLUTION EPIDURAL; INFILTRATION; INTRACAUDAL; PERINEURAL at 07:16:00

## 2021-03-16 RX ADMIN — DEXAMETHASONE SODIUM PHOSPHATE 4 MG: 4 MG/ML VIAL (ML) INJECTION at 07:25:00

## 2021-03-16 RX ADMIN — MIDAZOLAM HYDROCHLORIDE 2 MG: 1 INJECTION INTRAMUSCULAR; INTRAVENOUS at 06:55:00

## 2021-03-16 RX ADMIN — SODIUM CHLORIDE, SODIUM LACTATE, POTASSIUM CHLORIDE, CALCIUM CHLORIDE: 600; 310; 30; 20 INJECTION, SOLUTION INTRAVENOUS at 07:13:00

## 2021-03-16 RX ADMIN — CEFAZOLIN SODIUM/WATER 2 G: 2 G/20 ML SYRINGE (ML) INTRAVENOUS at 07:24:00

## 2021-03-16 NOTE — ANESTHESIA PROCEDURE NOTES
Airway  Urgency: Elective    Airway not difficult    General Information and Staff    Patient location during procedure: OR  Anesthesiologist: Mary oJ Le MD  Resident/CRNA: Rafaela Scanlon CRNA  Performed: anesthesiologist and CRNA     Indicat

## 2021-03-16 NOTE — ANESTHESIA PROCEDURE NOTES
Peripheral Block    Date/Time: 3/16/2021 6:55 AM  Performed by: Michelle Echavarria MD  Authorized by: Michelle Echavarria MD       General Information and Staff    Start Time:  3/16/2021 6:54 AM  End Time:  3/16/2021 6:58 AM  Anesthesiologist:  Michelle Echavarria MD  P

## 2021-03-16 NOTE — ANESTHESIA POSTPROCEDURE EVALUATION
Patient: Filippo Manrique    Procedure Summary     Date: 03/16/21 Room / Location: [facility] OR  / [facility] OR    Anesthesia Start: 2138 Anesthesia Stop:     Procedure: right knee open autologous chondrocyte implantation to the patella (Right Knee) Diagn

## 2021-03-16 NOTE — ANESTHESIA PREPROCEDURE EVALUATION
Anesthesia PreOp Note    HPI:     Karlene Nowak is a 27year old female who presents for preoperative consultation requested by: Wanda Shepard MD    Date of Surgery: 3/16/2021    Procedure(s):  right knee open autologous chondrocyte implantation to t KAYLEEN Mcgovern  lactated ringers infusion, , Intravenous, Continuous, Kush Savage MD, Last Rate: 20 mL/hr at 03/16/21 0626, New Bag at 03/16/21 0626  lidocaine 1%-EPINEPHrine 1:100,000 (XYLOCAINE/EPINEPHRINE) injection, 10 mL, Subcutaneous, Once, Do Dev Difficulty of Paying Living Expenses:   Food Insecurity:       Worried About [address] in the Last Year:       Ran Out of Food in the Last Year:   Transportation Needs:       Lack of Transportation (Medical):       Lack of Transportation (Non-Med anesthetic plan, benefits, risks including possible dental damage if relevant, major complications, and any alternative forms of anesthetic management. All of the patient's questions were answered to the best of my ability.  The patient desires the anesth

## 2021-03-16 NOTE — BRIEF OP NOTE
Pre-Operative Diagnosis: tear of articular cartilage of right knee, current, subsequent encounter     Post-Operative Diagnosis: tear of articular cartilage of right knee, current, subsequent encounter      Procedure Performed:   Procedure(s):  right knee o

## 2021-03-16 NOTE — H&P
History & Physical Examination    Patient Name: Pamela Holliday  MRN: [id number]  [facility]: [id number]  YOB: 1990    Diagnosis: right knee articular cartilage tear    Present Illness: This is a 28yoF with persistent right knee pain.  She underwen Smokeless tobacco: Never Used    Alcohol use:  Yes      Alcohol/week: 1.0 - 5.0 standard drinks      Types: 1 - 5 Standard drinks or equivalent per week      SYSTEM Check if Review is Normal Check if Physical Exam is Normal If not normal, please explain:

## 2021-12-01 ENCOUNTER — TELEPHONE (OUTPATIENT)
Dept: FAMILY MEDICINE CLINIC | Facility: CLINIC | Age: 31
End: 2021-12-01

## 2021-12-01 DIAGNOSIS — Z00.00 ROUTINE HEALTH MAINTENANCE: Primary | ICD-10-CM

## 2021-12-01 NOTE — TELEPHONE ENCOUNTER
Please enter lab orders for the patient's upcoming physical appointment. Physical scheduled:    Your appointments     Date & Time Appointment Department [facility])    Dec 16, 2021  3:20 PM CST Adult Physical with Dee Patel  [facility] , R

## 2021-12-03 ENCOUNTER — OFFICE VISIT (OUTPATIENT)
Dept: FAMILY MEDICINE CLINIC | Facility: CLINIC | Age: 31
End: 2021-12-03
Payer: COMMERCIAL

## 2021-12-03 VITALS
WEIGHT: 177 LBS | BODY MASS INDEX: 24.78 KG/M2 | OXYGEN SATURATION: 98 % | TEMPERATURE: 98 F | RESPIRATION RATE: 12 BRPM | HEIGHT: 71 IN | HEART RATE: 60 BPM | SYSTOLIC BLOOD PRESSURE: 130 MMHG | DIASTOLIC BLOOD PRESSURE: 71 MMHG

## 2021-12-03 DIAGNOSIS — H57.89 EYE IRRITATION: Primary | ICD-10-CM

## 2021-12-03 PROCEDURE — 3008F BODY MASS INDEX DOCD: CPT | Performed by: NURSE PRACTITIONER

## 2021-12-03 PROCEDURE — 3078F DIAST BP <80 MM HG: CPT | Performed by: NURSE PRACTITIONER

## 2021-12-03 PROCEDURE — 99213 OFFICE O/P EST LOW 20 MIN: CPT | Performed by: NURSE PRACTITIONER

## 2021-12-03 PROCEDURE — 3075F SYST BP GE 130 - 139MM HG: CPT | Performed by: NURSE PRACTITIONER

## 2021-12-03 NOTE — PATIENT INSTRUCTIONS
What Are Dry Eyes? Do your eyes ever sting, burn, or feel scratchy? To be comfortable, your eyes need to be bathed, or lubricated, with tears. Normally, there is always a film of tears on the surface of your eyes.  But if your eyes don’t produce enough t production and reduce inflammation. Or they may advise putting in plugs. In some cases surgery may be advised. This is done to stop the draining and increase the tear film.      Artificial tears  Artificial tears (lubricating eye drops) replace your natural 8/1/2020 © 2000-2021 [employer] [address]. All rights reserved. This information is not intended as a substitute for professional medical care. Always follow your healthcare professional's instructions.         Understanding Computer Vision Syndrome syndrome  Computer vision syndrome can cause symptoms such as:  · Tired eyes  · Eye discomfort  · Dry eye  · Red eyes  · Eye tearing  · Itchy eyes  · Blurred vision  · Double vision  · Headaches  Most of these symptoms last a short time, and lessen or go a

## 2021-12-03 NOTE — PROGRESS NOTES
CHIEF COMPLAINT:   Patient presents with:  Eye Problem: Entered by patient      HPI:   Moon Ambrose is a 32year old female who presents with chief complaint of \"burning eyes\".  Symptoms have occurred intermittently for \"awhile\" but seems worse than n Social History    Tobacco Use      Smoking status: Never Smoker      Smokeless tobacco: Never Used    Vaping Use      Vaping Use: Never used    Alcohol use:  Yes      Alcohol/week: 1.0 - 5.0 standard drink      Types: 1 - 5 Standard drinks or equivalent p encounter diagnosis)    PLAN:   Discussed OTC artificial tears. Restart allergy medications: antihistamineand flonase daily, sudafed prn. Keep appointment with Ophtho. Hygeine and comfort care as listed in patient instructions.     Medication as listed b lubricating tears flow  Lubricating tears flow from glands in your upper eyelid over the surface of your eye. From your eye, the tears drain into small openings (puncta). These connect to drainage canals that lead to your nose.      [employer] last reviewed t will figure out which type of plug to use. Some may be used short-term (temporary). Others are designed to be lasting (permanent). Your eyes will be numbed with drops when the plugs are inserted.  There may be mild discomfort, but generally this isn't painf the computer  · Poor lighting  · Glare from the computer screen  · Sitting too close to the screen  · Positioning the screen at a wrong angle  · Not taking breaks while you are working  · Using an older-style monitor instead of a flat-screen monitor  Dry e to [facility].  [employer] last reviewed this educational content on 3/1/2018  © 1586-3157 [employer] [address]. All rights reserved. This information is not intended as a substitute for professional medical care.  Always follow your healthcare professional's i

## 2021-12-10 ENCOUNTER — OFFICE VISIT (OUTPATIENT)
Dept: FAMILY MEDICINE CLINIC | Facility: CLINIC | Age: 31
End: 2021-12-10
Payer: COMMERCIAL

## 2021-12-10 VITALS
TEMPERATURE: 99 F | HEIGHT: 71 IN | BODY MASS INDEX: 24.92 KG/M2 | DIASTOLIC BLOOD PRESSURE: 70 MMHG | HEART RATE: 58 BPM | WEIGHT: 178 LBS | RESPIRATION RATE: 16 BRPM | SYSTOLIC BLOOD PRESSURE: 120 MMHG

## 2021-12-10 DIAGNOSIS — Z00.00 WELL WOMAN EXAM WITHOUT GYNECOLOGICAL EXAM: Primary | ICD-10-CM

## 2021-12-10 PROCEDURE — 99395 PREV VISIT EST AGE 18-39: CPT | Performed by: FAMILY MEDICINE

## 2021-12-10 PROCEDURE — 3078F DIAST BP <80 MM HG: CPT | Performed by: FAMILY MEDICINE

## 2021-12-10 PROCEDURE — 3008F BODY MASS INDEX DOCD: CPT | Performed by: FAMILY MEDICINE

## 2021-12-10 PROCEDURE — 3074F SYST BP LT 130 MM HG: CPT | Performed by: FAMILY MEDICINE

## 2021-12-10 NOTE — PROGRESS NOTES
SUBJECTIVE:  Patient presents with:  Physical: WWE no pap     HPI:  B/L eye discomfort, R >L. Notes that she has hx of lasik. Wondering about dry eyes and allergies as a cause. Notes headache behind eyes when travelling.  Thought it was eye strain relat above    HISTORY:  Past Medical History:   Diagnosis Date   • Pilar cysts    • Visual impairment     wears glasses      Past Surgical History:   Procedure Laterality Date   • ARTHROSCOPY OF JOINT UNLISTED Right 2019   • CREATE EARDRUM OPENING,GEN ANESTH  2 Visit Diagnoses     Well woman exam without gynecological exam    -  Primary        Olivier Dustin was seen today for physical.    Diagnoses and all orders for this visit:    Well woman exam without gynecological exam  Vaccines: Indicated today: UTD  Obesity scr

## 2023-07-26 ENCOUNTER — OFFICE VISIT (OUTPATIENT)
Dept: FAMILY MEDICINE CLINIC | Facility: CLINIC | Age: 33
End: 2023-07-26
Payer: COMMERCIAL

## 2023-07-26 VITALS
TEMPERATURE: 98 F | BODY MASS INDEX: 26 KG/M2 | WEIGHT: 185 LBS | OXYGEN SATURATION: 97 % | SYSTOLIC BLOOD PRESSURE: 124 MMHG | HEART RATE: 74 BPM | RESPIRATION RATE: 16 BRPM | DIASTOLIC BLOOD PRESSURE: 84 MMHG

## 2023-07-26 DIAGNOSIS — B02.9 HERPES ZOSTER WITHOUT COMPLICATION: Primary | ICD-10-CM

## 2023-07-26 DIAGNOSIS — R51.9 ACUTE NONINTRACTABLE HEADACHE, UNSPECIFIED HEADACHE TYPE: ICD-10-CM

## 2023-07-26 PROBLEM — M17.11 UNILATERAL PRIMARY OSTEOARTHRITIS, RIGHT KNEE: Status: ACTIVE | Noted: 2019-02-13

## 2023-07-26 PROBLEM — M67.52 PLICA SYNDROME OF LEFT KNEE: Status: ACTIVE | Noted: 2021-11-18

## 2023-07-26 PROBLEM — M21.069 ACQUIRED GENU VALGUM: Status: ACTIVE | Noted: 2019-02-13

## 2023-07-26 PROBLEM — S83.31XA: Status: ACTIVE | Noted: 2019-11-14

## 2023-07-26 PROCEDURE — 3074F SYST BP LT 130 MM HG: CPT

## 2023-07-26 PROCEDURE — 3079F DIAST BP 80-89 MM HG: CPT

## 2023-07-26 PROCEDURE — 99213 OFFICE O/P EST LOW 20 MIN: CPT

## 2023-07-26 RX ORDER — VALACYCLOVIR HYDROCHLORIDE 1 G/1
1000 TABLET, FILM COATED ORAL 3 TIMES DAILY
Qty: 21 TABLET | Refills: 0 | Status: SHIPPED | OUTPATIENT
Start: 2023-07-26 | End: 2023-08-02

## 2023-07-26 RX ORDER — ERGOCALCIFEROL 1.25 MG/1
1.25 CAPSULE ORAL WEEKLY
COMMUNITY
Start: 2022-06-13

## 2023-10-19 ENCOUNTER — TELEPHONE (OUTPATIENT)
Dept: ORTHOPEDICS CLINIC | Facility: CLINIC | Age: 33
End: 2023-10-19

## 2023-10-19 DIAGNOSIS — M25.561 RIGHT KNEE PAIN, UNSPECIFIED CHRONICITY: Primary | ICD-10-CM

## 2023-10-19 NOTE — TELEPHONE ENCOUNTER
Future Appointments   Date Time Provider Norris Wilsoni   10/20/2023 10:50 AM Corky Heimlich, MD [id number]     Spoke to patient and she might not be able to get her CD's on time. If we can just put the order for her RT Knee pain so she may be able to complete them prior to appt. Thanks.     Patient may be reached at [phone]

## 2023-10-19 NOTE — TELEPHONE ENCOUNTER
Left voicemail asking if patient has the MRI disc or XR discs since they have seen another provider for the same issue

## 2023-10-20 ENCOUNTER — HOSPITAL ENCOUNTER (OUTPATIENT)
Dept: GENERAL RADIOLOGY | Age: 33
Discharge: HOME | End: 2023-10-20
Attending: ORTHOPAEDIC SURGERY

## 2023-10-20 ENCOUNTER — OFFICE VISIT (OUTPATIENT)
Dept: ORTHOPEDICS CLINIC | Facility: CLINIC | Age: 33
End: 2023-10-20
Payer: COMMERCIAL

## 2023-10-20 VITALS — HEIGHT: 71 IN | BODY MASS INDEX: 26.6 KG/M2 | WEIGHT: 190 LBS

## 2023-10-20 DIAGNOSIS — M25.561 RIGHT KNEE PAIN, UNSPECIFIED CHRONICITY: ICD-10-CM

## 2023-10-20 DIAGNOSIS — M22.41 CHONDROMALACIA OF PATELLOFEMORAL JOINT, RIGHT: Primary | ICD-10-CM

## 2023-10-20 DIAGNOSIS — M25.362 PATELLAR INSTABILITY OF LEFT KNEE: ICD-10-CM

## 2023-10-20 PROCEDURE — 3008F BODY MASS INDEX DOCD: CPT | Performed by: ORTHOPAEDIC SURGERY

## 2023-10-20 PROCEDURE — 99204 OFFICE O/P NEW MOD 45 MIN: CPT | Performed by: ORTHOPAEDIC SURGERY

## 2023-10-20 PROCEDURE — 73564 X-RAY EXAM KNEE 4 OR MORE: CPT | Performed by: ORTHOPAEDIC SURGERY

## 2023-11-28 ENCOUNTER — OFFICE VISIT (OUTPATIENT)
Dept: FAMILY MEDICINE CLINIC | Facility: CLINIC | Age: 33
End: 2023-11-28
Payer: COMMERCIAL

## 2023-11-28 VITALS
RESPIRATION RATE: 16 BRPM | HEIGHT: 71 IN | TEMPERATURE: 98 F | OXYGEN SATURATION: 99 % | SYSTOLIC BLOOD PRESSURE: 110 MMHG | WEIGHT: 190 LBS | HEART RATE: 64 BPM | BODY MASS INDEX: 26.6 KG/M2 | DIASTOLIC BLOOD PRESSURE: 70 MMHG

## 2023-11-28 DIAGNOSIS — H92.22 EAR DISCHARGE BLOOD, LEFT: Primary | ICD-10-CM

## 2023-11-28 PROCEDURE — 3008F BODY MASS INDEX DOCD: CPT | Performed by: NURSE PRACTITIONER

## 2023-11-28 PROCEDURE — 3074F SYST BP LT 130 MM HG: CPT | Performed by: NURSE PRACTITIONER

## 2023-11-28 PROCEDURE — 99213 OFFICE O/P EST LOW 20 MIN: CPT | Performed by: NURSE PRACTITIONER

## 2023-11-28 PROCEDURE — 3078F DIAST BP <80 MM HG: CPT | Performed by: NURSE PRACTITIONER

## 2023-11-28 NOTE — PATIENT INSTRUCTIONS
Do not use any Q-tips or other objects in ears  No swimming or ear submersion in water until re-eval.   Make an appointment with Dr. Caitlin Muñoz for an ear check and follow-up in the next 2-3 days

## 2023-11-29 ENCOUNTER — OFFICE VISIT (OUTPATIENT)
Dept: OTOLARYNGOLOGY | Facility: CLINIC | Age: 33
End: 2023-11-29

## 2023-11-29 VITALS — BODY MASS INDEX: 27 KG/M2 | WEIGHT: 190 LBS

## 2023-11-29 DIAGNOSIS — H93.8X2 BLOOD CLOT OF LEFT EAR: ICD-10-CM

## 2023-11-29 DIAGNOSIS — Z96.22 HISTORY OF PLACEMENT OF EAR TUBES: Primary | ICD-10-CM

## 2023-11-29 PROCEDURE — 69210 REMOVE IMPACTED EAR WAX UNI: CPT | Performed by: STUDENT IN AN ORGANIZED HEALTH CARE EDUCATION/TRAINING PROGRAM

## 2023-11-29 PROCEDURE — 99203 OFFICE O/P NEW LOW 30 MIN: CPT | Performed by: STUDENT IN AN ORGANIZED HEALTH CARE EDUCATION/TRAINING PROGRAM

## 2023-11-29 RX ORDER — TOBRAMYCIN AND DEXAMETHASONE 3; 1 MG/ML; MG/ML
SUSPENSION/ DROPS OPHTHALMIC
Qty: 10 ML | Refills: 0 | Status: SHIPPED | OUTPATIENT
Start: 2023-11-29

## 2024-02-05 NOTE — PROGRESS NOTES
Chief Complaint:  Patient presents with:  Mouth/Lip Problem: Jaw popping x 10 years, getting worse now, gets stiff , pops everytime she yawns and eats     HPI:  This is a 34year old female patient presenting for Mouth/Lip Problem (Jaw popping x 10 years, Unique Light, was evaluated through a synchronous (real-time) audio-video encounter. The patient (or guardian if applicable) is aware that this is a billable service, which includes applicable co-pays. This Virtual Visit was conducted with patient's (and/or legal guardian's) consent. Patient identification was verified, and a caregiver was present when appropriate.   The patient was located at Home: [address] [place] [id number]  Provider was located at Facility (Appt Dept): [address] [place] [phone]      Unique Light (:  well hydrated and in no acute distress  HEENT: atraumatic, conjunctiva clear, TMs pearly grey B/L without effusion or erythema, nasal turbinates without erythema or edema, pharynx without erythema, no tonsilar exudate, B/L symmetric jaw popping on opening,

## 2024-07-29 ENCOUNTER — TELEPHONE (OUTPATIENT)
Dept: ORTHOPEDICS CLINIC | Facility: CLINIC | Age: 34
End: 2024-07-29

## 2024-07-29 DIAGNOSIS — M25.531 RIGHT WRIST PAIN: Primary | ICD-10-CM

## 2024-07-29 DIAGNOSIS — M25.532 LEFT WRIST PAIN: ICD-10-CM

## 2024-07-29 NOTE — TELEPHONE ENCOUNTER
Patient is scheduled for PRINCESS wrist pain. No X-rays in Epic. Please advise if imaging is needed. Already let the patient know to arrive early for X-rays.  Future Appointments   Date Time Provider Department Center   8/1/2024  9:30 AM [facility] Burn[id number]

## 2024-08-01 ENCOUNTER — HOSPITAL ENCOUNTER (OUTPATIENT)
Dept: GENERAL RADIOLOGY | Age: 34
Discharge: HOME | End: 2024-08-01
Attending: ORTHOPAEDIC SURGERY
Payer: COMMERCIAL

## 2024-08-01 ENCOUNTER — OFFICE VISIT (OUTPATIENT)
Dept: ORTHOPEDICS CLINIC | Facility: CLINIC | Age: 34
End: 2024-08-01
Payer: COMMERCIAL

## 2024-08-01 VITALS — HEIGHT: 71 IN | WEIGHT: 190 LBS | BODY MASS INDEX: 26.6 KG/M2

## 2024-08-01 DIAGNOSIS — M25.532 LEFT WRIST PAIN: Primary | ICD-10-CM

## 2024-08-01 DIAGNOSIS — M25.531 RIGHT WRIST PAIN: ICD-10-CM

## 2024-08-01 DIAGNOSIS — M25.532 LEFT WRIST PAIN: ICD-10-CM

## 2024-08-01 PROCEDURE — 99204 OFFICE O/P NEW MOD 45 MIN: CPT | Performed by: ORTHOPAEDIC SURGERY

## 2024-08-01 PROCEDURE — 3008F BODY MASS INDEX DOCD: CPT | Performed by: ORTHOPAEDIC SURGERY

## 2024-08-01 PROCEDURE — 73110 X-RAY EXAM OF WRIST: CPT | Performed by: ORTHOPAEDIC SURGERY

## 2024-08-01 NOTE — H&P
Clinic Note     Assessment/Plan:  34 year old female    Left dorsal hand pain-etiology uncertain.  Could be tendinitis versus occult ganglion cyst.  Will obtain an MRI for further evaluation characterization.  Patient does not have a family history of autoimmune conditions such as rheumatoid arthritis psoriatic arthritis, etc.  Symptoms would be atypical for autoimmune condition however if the MRI is normal tendinitis could be considered as the likely explanation.  Could consider a rheumatological evaluation  Right dorsal hand pain-we will left side first as it is clearly the more symptomatic side.    Follow Up: Will discuss results of the MRI once completed    Diagnostic Studies:     XR Right wrist 3 views: No fractures dislocations or osseous abnormalities  XR Left wrist 3 views: No fractures dislocations or osseous abnormalities       Physical Exam:     Ht 5' 11\" (1.803 m)   Wt 190 lb (86.2 kg)   BMI 26.50 kg/m²     Constitutional: NAD. AOx3. Well-developed and Well-nourished.   Psychiatric: Normal mood/ affect/ behavior. Judgment and thought content normal.     Bilateral Upper Extremity:     Inspection    Skin intact. No skin lesions. No obvious mass visualized.  No tenosynovitis   Palpation    No focal area of tenderness to palpation.  No obviously palpable cyst or masses      ROM    Full composite fist., Normal symmetric wrist motion., and Normal symmetric elbow motion.     Neurovascular    Normal sensation in the median, ulnar, and radial nerve distribution. Normal motor function of muscles innervated by median/AIN, ulnar, and radial/PIN nerves.    Normally perfused hand(s).     Special    None          CC: Bilateral dorsal hand pain    HPI: This 34 year old bilateral dorsal hand pain.  She describes throbbingRHD female presents with pain.  Can be mild to moderate.  She sometimes uses a brace.  She also occasionally takes Advil.  The symptoms are intermittent.  Currently the left side is more symptomatic  than the right side however right side can be more symptomatic than the left.    Occupation:     History/Other:   Past Medical History:    Pilar cysts    Visual impairment    wears glasses     Past Surgical History:   Procedure Laterality Date    Arthroscopy of joint unlisted Right 2019    Create eardrum opening,gen anesth  2019    Each add tooth extraction  2003    [place] teeth removed  2007     Current Outpatient Medications   Medication Sig Dispense Refill    Levonorgestrel 20 MCG/DAY Intrauterine IUD MIRENA (52 MG) 20 MCG/24HR IUD      clonazePAM 0.5 MG Oral Tab Take 1 tablet (0.5 mg total) by mouth 2 (two) times daily as needed for Anxiety. 20 tablet 0    tobramycin-dexamethasone 0.3-0.1 % Ophthalmic Suspension Apply 5 drops twice a day to left ears for 7 days. 10 mL 0     Allergies   Allergen Reactions    Dander ITCHING     Cat Hair    Other ITCHING     Cat Hair   Cat Hair     Family History   Problem Relation Age of Onset    Other (Other) Father         sleep apnea    Heart Disorder Maternal Grandmother     Other (Other) Brother         sleep apnea    Diabetes Maternal Aunt      Social History     Occupational History    Not on file   Tobacco Use    Smoking status: Never    Smokeless tobacco: Never   Vaping Use    Vaping status: Never Used   Substance and Sexual Activity    Alcohol use: Yes     Alcohol/week: 1.0 - 5.0 standard drink of alcohol     Types: 1 - 5 Standard drinks or equivalent per week     Comment: 1-2 drinks a week    Drug use: No    Sexual activity: Yes     Partners: Male     Birth control/protection: I.U.D.        Review of Systems (negative unless bolded):  General: fevers, chills, fatigue  CV:  chest pain, palpitations, leg swelling  Msk: bodyaches, neck pain, neck stiffness  Skin: rashes, open wounds, nonhealing ulcers  Hem: bleeds easily, bruise easily, immunocompromised  Neuro: dizziness, light headedness, headaches  Psych: anxious, depressed, anger issues      Scott  MD [facility]   [facility] W[facility] El[facility]  [email or web address]  t: [phone]  f: [phone]

## 2024-10-01 ENCOUNTER — OFFICE VISIT (OUTPATIENT)
Dept: FAMILY MEDICINE CLINIC | Facility: CLINIC | Age: 34
End: 2024-10-01
Payer: COMMERCIAL

## 2024-10-01 VITALS
RESPIRATION RATE: 16 BRPM | BODY MASS INDEX: 19 KG/M2 | HEART RATE: 69 BPM | WEIGHT: 135 LBS | TEMPERATURE: 98 F | OXYGEN SATURATION: 99 % | SYSTOLIC BLOOD PRESSURE: 120 MMHG | DIASTOLIC BLOOD PRESSURE: 60 MMHG

## 2024-10-01 DIAGNOSIS — Z96.22 HISTORY OF PLACEMENT OF EAR TUBES: ICD-10-CM

## 2024-10-01 DIAGNOSIS — H92.02 LEFT EAR PAIN: Primary | ICD-10-CM

## 2024-10-01 PROCEDURE — 99213 OFFICE O/P EST LOW 20 MIN: CPT

## 2024-10-01 PROCEDURE — 3074F SYST BP LT 130 MM HG: CPT

## 2024-10-01 PROCEDURE — 3078F DIAST BP <80 MM HG: CPT

## 2024-10-01 NOTE — PROGRESS NOTES
Subjective:   Patient ID: Sarah Knight is a 34 year old female.    Pt presents to [facility] c/o increased sensitivity in left ear for last few days. Pt denies changes in hearing acuity, or trauma to the ear. Talking on the phone on the left side increases the pain and nothing improves the pain. Pt denies headache, dizziness or nausea. Endorses tinnitus, but reports that it is chronic and unchanging.    Ear Problem   There is pain in the left ear. This is a new problem. The current episode started in the past 7 days. The problem occurs constantly. The problem has been unchanged. There has been no fever. Pertinent negatives include no ear discharge, headaches or hearing loss. She has tried nothing for the symptoms. Her past medical history is significant for a tympanostomy tube.     History/Other:   Review of Systems   Constitutional:  Negative for fever.   HENT:  Positive for ear pain. Negative for congestion, ear discharge, hearing loss, sinus pressure and sinus pain.    Neurological:  Negative for dizziness and headaches.   All other systems reviewed and are negative.  Current Outpatient Medications   Medication Sig Dispense Refill    Levonorgestrel 20 MCG/DAY Intrauterine IUD MIRENA (52 MG) 20 MCG/24HR IUD      tobramycin-dexamethasone 0.3-0.1 % Ophthalmic Suspension Apply 5 drops twice a day to left ears for 7 days. 10 mL 0    clonazePAM 0.5 MG Oral Tab Take 1 tablet (0.5 mg total) by mouth 2 (two) times daily as needed for Anxiety. (Patient not taking: Reported on 10/1/2024) 20 tablet 0     Allergies:  Allergies   Allergen Reactions    Dust OTHER (SEE COMMENTS)     Dust and pollen, nose gets itchy and sinus congestion.    Dander ITCHING     Cat Hair    Other ITCHING     Cat Hair   Cat Hair       Objective:   Physical Exam  Vitals reviewed.   HENT:      Head: Normocephalic and atraumatic.      Right Ear: Tympanic membrane, ear canal and external ear normal. No decreased hearing noted. Tympanic membrane is not  perforated, erythematous, retracted or bulging.      Left Ear: Tympanic membrane, ear canal and external ear normal. No decreased hearing noted. A PE tube is present. Tympanic membrane is not perforated, erythematous, retracted or bulging.      Nose: Nose normal.      Mouth/Throat:      Mouth: Mucous membranes are moist.      Pharynx: Oropharynx is clear.   Eyes:      Extraocular Movements: Extraocular movements intact.      Conjunctiva/sclera: Conjunctivae normal.      Pupils: Pupils are equal, round, and reactive to light.   Cardiovascular:      Rate and Rhythm: Normal rate and regular rhythm.      Pulses: Normal pulses.      Heart sounds: Normal heart sounds.   Pulmonary:      Effort: Pulmonary effort is normal.      Breath sounds: Normal breath sounds.   Musculoskeletal:      Cervical back: Normal range of motion and neck supple.   Skin:     General: Skin is warm and dry.      Capillary Refill: Capillary refill takes less than 2 seconds.   Neurological:      Mental Status: She is alert.     Assessment & Plan:   1. Left ear pain    2. History of placement of ear tubes        Pt instructed to f/u with ENT if pain persists.    Meds This Visit:  Requested Prescriptions      No prescriptions requested or ordered in this encounter       Imaging & Referrals:  None

## 2024-11-25 ENCOUNTER — E-VISIT (OUTPATIENT)
Dept: TELEHEALTH | Age: 34
End: 2024-11-25
Payer: COMMERCIAL

## 2024-11-25 DIAGNOSIS — H92.09 ACUTE EAR PAIN, UNSPECIFIED LATERALITY: Primary | ICD-10-CM

## 2024-11-26 PROCEDURE — 99421 OL DIG E/M SVC 5-10 MIN: CPT | Performed by: NURSE PRACTITIONER

## 2024-11-26 NOTE — PROGRESS NOTES
Sarah Knight is a 34 year old female submitting e-visit for ear pain.  HPI:   See answers to questionnaire and [employer] message exchange      Current Outpatient Medications   Medication Sig Dispense Refill    tobramycin-dexamethasone 0.3-0.1 % Ophthalmic Suspension Apply 5 drops twice a day to left ears for 7 days. 10 mL 0    Levonorgestrel 20 MCG/DAY Intrauterine IUD MIRENA (52 MG) 20 MCG/24HR IUD      clonazePAM 0.5 MG Oral Tab Take 1 tablet (0.5 mg total) by mouth 2 (two) times daily as needed for Anxiety. (Patient not taking: Reported on 10/1/2024) 20 tablet 0      Past Medical History:    Pilar cysts    Visual impairment    wears glasses      Past Surgical History:   Procedure Laterality Date    Arthroscopy of joint unlisted Right 2019    Create eardrum opening,gen anesth  2019    Each add tooth extraction  2003    [place] teeth removed  2007      Family History   Problem Relation Age of Onset    Other (Other) Father         sleep apnea    Heart Disorder Maternal Grandmother     Other (Other) Brother         sleep apnea    Diabetes Maternal Aunt       Social History:  Social History     Socioeconomic History    Marital status: Single   Tobacco Use    Smoking status: Never    Smokeless tobacco: Never   Vaping Use    Vaping status: Never Used   Substance and Sexual Activity    Alcohol use: Yes     Alcohol/week: 1.0 - 5.0 standard drink of alcohol     Types: 1 - 5 Standard drinks or equivalent per week     Comment: 1-2 drinks a week    Drug use: No    Sexual activity: Yes     Partners: Male     Birth control/protection: I.U.D.   Other Topics Concern    Caffeine Concern Yes     Comment: 1-2 servings a day    Exercise Yes     Comment: therapy 2-3 times a week for knee surgery 3/2021    Seat Belt Yes         ASSESSMENT AND PLAN:       Diagnoses and all orders for this visit:    Acute ear pain, unspecified laterality        After reviewing questionnaire, a higher level of care was recommended d/t limitations of  telehealth.  Pt has PE tubes.  Seen in [facility] last month for ear pain with no abnormal exam findings.  Was referred to ENT at that time.   Referred to ENT or PCP/WIC/IC for in-person exam to guide treatment decisions  Refer to [employer] message exchange for specific patient instructions      Duration of  the service:  5 minutes

## 2025-01-20 ENCOUNTER — TELEPHONE (OUTPATIENT)
Dept: FAMILY MEDICINE CLINIC | Facility: CLINIC | Age: 35
End: 2025-01-20

## 2025-01-20 DIAGNOSIS — Z00.00 ROUTINE HEALTH MAINTENANCE: Primary | ICD-10-CM

## 2025-01-20 NOTE — TELEPHONE ENCOUNTER
Please enter lab orders for the patient's upcoming physical appointment.     Physical scheduled:   Your appointments       Date & Time Appointment Department ([place])    Feb 25, 2025 4:00 PM CST Adult Physical with Madalyn Mckay DO [place], [facility] ([facility])    PLEASE NOTE - Most insurances allow a Complete Physical once every 366 days. Please schedule accordingly.    Please arrive 15 minutes prior to your scheduled appointment. Please also bring your Insurance card, Photo ID, and your medication bottles or a list of your current medication.    If you no longer require this appointment, please contact your physician office to cancel.              [place], [facility] [address]  [address] [place] [id number]  [phone]           Preferred lab: [facility] ([facility])     The patient has been notified to complete fasting labs prior to their physical appointment.

## 2025-02-17 ENCOUNTER — LAB ENCOUNTER (OUTPATIENT)
Dept: LAB | Age: 35
End: 2025-02-17
Attending: FAMILY MEDICINE
Payer: COMMERCIAL

## 2025-02-17 DIAGNOSIS — Z00.00 ROUTINE HEALTH MAINTENANCE: ICD-10-CM

## 2025-02-17 LAB
ALBUMIN SERPL-MCNC: 4.4 G/DL (ref 3.2–4.8)
ALBUMIN/GLOB SERPL: 2 {RATIO} (ref 1–2)
ALP LIVER SERPL-CCNC: 59 U/L
ALT SERPL-CCNC: 24 U/L
ANION GAP SERPL CALC-SCNC: 7 MMOL/L (ref 0–18)
AST SERPL-CCNC: 17 U/L (ref ?–34)
BASOPHILS # BLD AUTO: 0.05 X10(3) UL (ref 0–0.2)
BASOPHILS NFR BLD AUTO: 0.6 %
BILIRUB SERPL-MCNC: 0.7 MG/DL (ref 0.3–1.2)
BUN BLD-MCNC: 11 MG/DL (ref 9–23)
BUN/CREAT SERPL: 14.5 (ref 10–20)
CALCIUM BLD-MCNC: 8.7 MG/DL (ref 8.7–10.4)
CHLORIDE SERPL-SCNC: 109 MMOL/L (ref 98–112)
CHOLEST SERPL-MCNC: 155 MG/DL (ref ?–200)
CO2 SERPL-SCNC: 25 MMOL/L (ref 21–32)
CREAT BLD-MCNC: 0.76 MG/DL
DEPRECATED RDW RBC AUTO: 40.6 FL (ref 35.1–46.3)
EGFRCR SERPLBLD CKD-EPI 2021: 105 ML/MIN/1.73M2 (ref 60–?)
EOSINOPHIL # BLD AUTO: 0.12 X10(3) UL (ref 0–0.7)
EOSINOPHIL NFR BLD AUTO: 1.5 %
ERYTHROCYTE [DISTWIDTH] IN BLOOD BY AUTOMATED COUNT: 12 % (ref 11–15)
EST. AVERAGE GLUCOSE BLD GHB EST-MCNC: 97 MG/DL (ref 68–126)
FASTING PATIENT LIPID ANSWER: YES
FASTING STATUS PATIENT QL REPORTED: YES
GLOBULIN PLAS-MCNC: 2.2 G/DL (ref 2–3.5)
GLUCOSE BLD-MCNC: 94 MG/DL (ref 70–99)
HBA1C MFR BLD: 5 % (ref ?–5.7)
HCT VFR BLD AUTO: 40.8 %
HDLC SERPL-MCNC: 45 MG/DL (ref 40–59)
HGB BLD-MCNC: 14.7 G/DL
IMM GRANULOCYTES # BLD AUTO: 0.02 X10(3) UL (ref 0–1)
IMM GRANULOCYTES NFR BLD: 0.3 %
LDLC SERPL CALC-MCNC: 92 MG/DL (ref ?–100)
LYMPHOCYTES # BLD AUTO: 2.45 X10(3) UL (ref 1–4)
LYMPHOCYTES NFR BLD AUTO: 31.4 %
MCH RBC QN AUTO: 33.2 PG (ref 26–34)
MCHC RBC AUTO-ENTMCNC: 36 G/DL (ref 31–37)
MCV RBC AUTO: 92.1 FL
MONOCYTES # BLD AUTO: 0.52 X10(3) UL (ref 0.1–1)
MONOCYTES NFR BLD AUTO: 6.7 %
NEUTROPHILS # BLD AUTO: 4.65 X10 (3) UL (ref 1.5–7.7)
NEUTROPHILS # BLD AUTO: 4.65 X10(3) UL (ref 1.5–7.7)
NEUTROPHILS NFR BLD AUTO: 59.5 %
NONHDLC SERPL-MCNC: 110 MG/DL (ref ?–130)
OSMOLALITY SERPL CALC.SUM OF ELEC: 291 MOSM/KG (ref 275–295)
PLATELET # BLD AUTO: 191 10(3)UL (ref 150–450)
POTASSIUM SERPL-SCNC: 4 MMOL/L (ref 3.5–5.1)
PROT SERPL-MCNC: 6.6 G/DL (ref 5.7–8.2)
RBC # BLD AUTO: 4.43 X10(6)UL
SODIUM SERPL-SCNC: 141 MMOL/L (ref 136–145)
TRIGL SERPL-MCNC: 97 MG/DL (ref 30–149)
TSI SER-ACNC: 3.34 UIU/ML (ref 0.55–4.78)
VLDLC SERPL CALC-MCNC: 16 MG/DL (ref 0–30)
WBC # BLD AUTO: 7.8 X10(3) UL (ref 4–11)

## 2025-02-17 PROCEDURE — 84443 ASSAY THYROID STIM HORMONE: CPT

## 2025-02-17 PROCEDURE — 36415 COLL VENOUS BLD VENIPUNCTURE: CPT

## 2025-02-17 PROCEDURE — 83036 HEMOGLOBIN GLYCOSYLATED A1C: CPT

## 2025-02-17 PROCEDURE — 85025 COMPLETE CBC W/AUTO DIFF WBC: CPT

## 2025-02-17 PROCEDURE — 80061 LIPID PANEL: CPT

## 2025-02-17 PROCEDURE — 80053 COMPREHEN METABOLIC PANEL: CPT

## 2025-02-25 ENCOUNTER — OFFICE VISIT (OUTPATIENT)
Dept: FAMILY MEDICINE CLINIC | Facility: CLINIC | Age: 35
End: 2025-02-25
Payer: COMMERCIAL

## 2025-02-25 VITALS
RESPIRATION RATE: 16 BRPM | BODY MASS INDEX: 28.77 KG/M2 | WEIGHT: 203.19 LBS | TEMPERATURE: 99 F | DIASTOLIC BLOOD PRESSURE: 64 MMHG | HEIGHT: 70.51 IN | OXYGEN SATURATION: 98 % | SYSTOLIC BLOOD PRESSURE: 116 MMHG | HEART RATE: 70 BPM

## 2025-02-25 DIAGNOSIS — R25.3 EYELID TWITCH: ICD-10-CM

## 2025-02-25 DIAGNOSIS — Z00.00 WELL WOMAN EXAM WITHOUT GYNECOLOGICAL EXAM: Primary | ICD-10-CM

## 2025-02-25 DIAGNOSIS — E66.3 OVERWEIGHT (BMI 25.0-29.9): ICD-10-CM

## 2025-02-25 PROCEDURE — 99213 OFFICE O/P EST LOW 20 MIN: CPT | Performed by: FAMILY MEDICINE

## 2025-02-25 PROCEDURE — 99395 PREV VISIT EST AGE 18-39: CPT | Performed by: FAMILY MEDICINE

## 2025-02-25 NOTE — PROGRESS NOTES
SUBJECTIVE:  Chief Complaint   Patient presents with    Physical     WWE breast exam / no pap     History of Present Illness  Sarah Knight is a 34 year old female who presents for a yearly checkup with concerns about right eye twitching, weight management, and knee pain.    She has experienced frequent twitching in her right eye for the past year. Initially suspecting an electrolyte deficiency, she has been taking supplements without significant improvement. The twitching is intermittent and sometimes absent. She has been managing it by increasing hydration and monitoring stress levels. She recently started a new job and notes that her previous job was very stressful, which may have contributed to the twitching. A slight reduction in frequency has been observed since increasing water and electrolyte intake.    She is concerned about weight gain and body image issues. Despite efforts to lose weight, she feels she is gaining instead. She attributes some difficulty to a knee surgery in 2021, which has limited her ability to exercise. She is trying to walk more and do workout videos but finds it challenging due to cold weather and knee limitations. She plans to engage a  when the weather improves and has started using a health ricardo provided by her insurance to assist with healthy eating. She is frustrated with her current weight management efforts, noting that she used to maintain her weight more easily without much effort.    She underwent knee surgery in 2021, impacting her mobility and exercise routine. She has experienced setbacks in her recovery, such as a fall that resulted in a temporary inability to walk. Currently, she can perform normal activities like walking and climbing stairs but avoids high-impact exercises like running or squats due to occasional pain.    She has a history of ear issues, particularly with her left ear, which has been more sensitive and painful recently. An ENT  evaluation found no visible issues, but she still has a tube in place that may be contributing to her symptoms. She has tried ear drops with limited success and plans to try oral antibiotics.    She reports persistent lower back pain, which she attributes to a lack of muscle strength and improper lifting form. No specific treatments or interventions have been mentioned for this issue.      Health Maintenance:  Vaccines: reviewed as below. Indicated today: none  Immunization History   Administered Date(s) Administered    Covid-19 Vaccine Pfizer 30 mcg/0.3 ml 04/10/2021, 05/01/2021, 11/07/2021    FLUZONE 6 months and older PFS 0.5 ml (94949) 12/16/2016, 10/18/2019, 10/17/2020    Flucelvax 0.5 Ml Quad PFS Single Dose 10/15/2018    HPV (Gardasil) 08/06/2018    Influenza 10/27/2017, 10/15/2018, 10/18/2019, 10/25/2021, 10/15/2022, 11/23/2024    Influenza(Afluria)0.5ml QIV PFS 10/17/2020    TDAP 08/29/2019     Obesity screening: Body mass index is 28.73 kg/m².  Diabetes screening:  neg  Hypercholesterolemia screening:   Lab Results   Component Value Date    HDL 45 02/17/2025    HDL 49 10/24/2020    HDL 47 08/26/2019     Lab Results   Component Value Date    LDL 92 02/17/2025    LDL 75 10/24/2020    LDL 72 08/26/2019     Lab Results   Component Value Date    TRIG 97 02/17/2025    TRIG 79 10/24/2020    TRIG 115 08/26/2019        Depression screen:   Depression Screening (PHQ-2/PHQ-9): Over the LAST 2 WEEKS   Little interest or pleasure in doing things (over the last two weeks)?: Not at all  Little interest or pleasure in doing things: Not at all    Feeling down, depressed, or hopeless (over the last two weeks)?: Not at all  Feeling down, depressed, or hopeless: Not at all    PHQ-2 SCORE: 0  PHQ-2 SCORE: 0      Osteoporosis: No history of pathologic fractures. No steroid use, smoking, risk of falls, excessive alcohol use.  Cervical Cancer screening: Last Pap: 7/2023  Colon Cancer screening: Family history of colon cancer? no    Breast Cancer screening: Family history of breast cancer? no   STI: Desires testing for STIs? no  Tobacco use:  reports that she has never smoked. She has never used smokeless tobacco.    ROS: Negative unless stated above    HISTORY:  Past Medical History:    Pilar cysts    Visual impairment    wears glasses      Past Surgical History:   Procedure Laterality Date    Arthroscopy of joint unlisted Right 2019    Create eardrum opening,gen anesth  2019    Each add tooth extraction  2003    Stone teeth removed  2007      Family History   Problem Relation Age of Onset    Other (Other) Father         sleep apnea    Heart Disorder Maternal Grandmother     Other (Other) Brother         sleep apnea    Diabetes Maternal Aunt       Social History     Socioeconomic History    Marital status: Single   Tobacco Use    Smoking status: Never    Smokeless tobacco: Never   Vaping Use    Vaping status: Never Used   Substance and Sexual Activity    Alcohol use: Yes     Alcohol/week: 1.0 - 5.0 standard drink of alcohol     Types: 1 - 5 Standard drinks or equivalent per week     Comment: [facility]    Drug use: No    Sexual activity: Yes     Partners: Male     Birth control/protection: I.U.D.   Other Topics Concern    Caffeine Concern Yes     Comment: 1-2 servings a day    Exercise Yes     Comment: therapy 2-3 times a week for knee surgery 3/2021    Seat Belt Yes        Allergies:  Allergies[1]    OBJECTIVE:  PHYSICAL EXAM:  Vitals:    02/25/25 1623   BP: 116/64   BP Location: Left arm   Pulse: 70   Resp: 16   Temp: 98.6 °F (37 °C)   TempSrc: Oral   SpO2: 98%   Weight: 203 lb 3.2 oz (92.2 kg)   Height: 5' 10.51\" (1.791 m)     Physical Examination: General appearance - alert, well appearing, and in no distress and normal appearing weight  Mental status - alert, oriented to person, place, and time, normal mood, behavior, speech, dress, motor activity, and thought processes  Ears - bilateral TM's and external ear canals normal  Neck - supple, no  significant adenopathy  Chest - clear to auscultation, no wheezes, rales or rhonchi, symmetric air entry  Heart - normal rate, regular rhythm, normal S1, S2, no murmurs, rubs, clicks or gallops  Abdomen - soft, nontender, nondistended, no masses or organomegaly  Breasts - breasts appear normal, no suspicious masses, no skin or nipple changes or axillary nodes  Extremities - peripheral pulses normal, no pedal edema, no clubbing or cyanosis    ASSESSMENT & PLAN:  Sarah Knight is a 34 year old female is here for Physical (WWE breast exam / no pap)    Problem List Items Addressed This Visit    None  Visit Diagnoses       Well woman exam without gynecological exam    -  Primary    Overweight (BMI 25.0-29.9)        Eyelid twitch              Sarah was seen today for physical.    Diagnoses and all orders for this visit:    Well woman exam without gynecological exam  Routine health maintenance reviewed, discussed and updated as below. Healthy diet and exercise reviewed.     Overweight (BMI 25.0-29.9)  Eyelid twitch      Assessment & Plan  Eye Twitching  Intermittent right eye twitching for approximately one year, likely due to stress, caffeine intake, and eye strain. Recent job change may have reduced stress levels. Hydration and electrolyte supplementation have provided some improvement. Discussed that stress or other non-serious factors are common causes. Referral to neurology or ophthalmology may be considered if symptoms persist.  - Reduce caffeine intake  - Ensure adequate hydration  - Wear sunglasses to reduce eye strain  - Consider referral to neurology or ophthalmology if symptoms persist    Low Back Pain  Intermittent low back pain likely due to muscle weakness and improper lifting form. Limited physical activity due to previous knee surgery. Discussed benefits of muscle strengthening for weight management, pain relief, and bone health. Recommended Pilates or yoga for low-impact strengthening.  - Encourage  muscle strengthening exercises  - Consider Pilates or yoga  - Consider  for tailored exercise regimen  - Reassess if pain persists or worsens    Weight Management  Difficulty losing weight despite diet tracking and increased physical activity, limited by knee surgery recovery. She expresses frustration and concern about body image. Discussed GLP-1 medications for weight loss, noting they are not for long-term use and weight may return after stopping. Recommended trying a healthy eating ricardo and muscle strengthening before considering medication.  - Continue using the healthy eating ricardo  - Focus on muscle strengthening exercises  - Consider  for guidance  - Reevaluate in three months  - Check insurance coverage for weight loss medications  - Discuss potential medication options if no improvement in three months    Ear Sensitivity and Pain  Chronic left ear sensitivity and pain, likely related to the presence of a tube. ENT evaluation showed no significant findings. She plans to try oral antibiotics. Discussed that the tube may be causing sensitivity and airflow issues.  - Try prescribed oral antibiotics  - Consider tube removal if symptoms persist    General Health Maintenance  Routine health maintenance discussed. She is up to date on most screenings and vaccinations. HPV vaccine series incomplete due to previous insurance coverage issues. Discussed that HPV vaccine is now approved up to age 45 and worth checking with insurance for coverage.  - Check insurance coverage for HPV vaccine  - Consider completing HPV vaccine series if covered    Follow-up  - Schedule follow-up appointment in three months.        Call or return to clinic prn if these symptoms worsen or fail to improve as anticipated. RTC in 1 year.   Madalyn Mckay DO  2/25/2025 4:56 PM    Note to Patient  The 21st Century Cures Act makes medical notes like these available to patients in the interest of transparency. However,  be advised this is a medical document and is intended as ocht-pz-[id number] communication; it is written in medical language and may appear blunt, direct, or contain abbreviations or verbiage that are unfamiliar. Medical documents are intended to carry relevant information, facts as evident, and the clinical opinion of the practitioner.     This report has been produced using speech recognition software, and may contain errors related to grammar, punctuation, spelling, words or phrases unrecognized or not translated appropriately to text; these errors may be referred to the dictating provider for further clarification and/or addendum as needed.         [1]   Allergies  Allergen Reactions    Dust OTHER (SEE COMMENTS)     Dust and pollen, nose gets itchy and sinus congestion.    Dander ITCHING     Cat Hair    Other ITCHING     Cat Hair   Cat Hair

## 2025-02-25 NOTE — PROGRESS NOTES
The following individual(s) verbally consented to be recorded using ambient AI listening technology and understand that they can each withdraw their consent to this listening technology at any point by asking the clinician to turn off or pause the recording:    Patient name: Sarah Sanders Antonia

## 2025-02-27 ENCOUNTER — PATIENT MESSAGE (OUTPATIENT)
Dept: FAMILY MEDICINE CLINIC | Facility: CLINIC | Age: 35
End: 2025-02-27

## 2025-02-28 ENCOUNTER — TELEPHONE (OUTPATIENT)
Dept: FAMILY MEDICINE CLINIC | Facility: CLINIC | Age: 35
End: 2025-02-28

## 2025-02-28 NOTE — TELEPHONE ENCOUNTER
Paperwork completed and placed in Dr. Mckay's in box. Would like it sent over via My chart once signed.

## 2025-03-04 NOTE — TELEPHONE ENCOUNTER
Called pt to let know form is complete    Faxed to [phone]    Copy made for tickler and scanning

## (undated) DEVICE — CONTAINER SPEC STR 4OZ GRY LID

## (undated) DEVICE — TUBING IRR 16FT CNT WV 3 ASCP

## (undated) DEVICE — DRAPE SRG 90X60IN BCK TBL CVR

## (undated) DEVICE — GAMMEX® NON-LATEX PI ORTHO SIZE 8, STERILE POLYISOPRENE POWDER-FREE SURGICAL GLOVE: Brand: GAMMEX

## (undated) DEVICE — GAMMEX® PI HYBRID SIZE 7.5, STERILE POWDER-FREE SURGICAL GLOVE, POLYISOPRENE AND NEOPRENE BLEND: Brand: GAMMEX

## (undated) DEVICE — 3M™ MEDITPORE™ SOFT CLOTH TAPE 6 IN X 10 YD 12 ROLLS/CASE 2966: Brand: 3M™ MEDIPORE™

## (undated) DEVICE — ENCORE® LATEX MICRO SIZE 7.5, STERILE LATEX POWDER-FREE SURGICAL GLOVE: Brand: ENCORE

## (undated) DEVICE — DRESSING 10X4IN ANMC SAFETAC

## (undated) DEVICE — LOWER EXTREMITY: Brand: MEDLINE INDUSTRIES, INC.

## (undated) DEVICE — SPINOCAN® 18 GA. X 3-1/2 IN. (90 MM) SPINAL NEEDLE: Brand: SPINOCAN®

## (undated) DEVICE — ABDOMINAL PAD: Brand: CURITY

## (undated) DEVICE — ANGIO CATH 16GX2

## (undated) DEVICE — 3M™ STERI-DRAPE™ U-DRAPE 1015: Brand: STERI-DRAPE™

## (undated) DEVICE — COVER STND 54X23IN MAYO REINF

## (undated) DEVICE — CHLORAPREP 26ML APPLICATOR

## (undated) DEVICE — Device

## (undated) DEVICE — SUTURE MONOCRYL 4-0 PS-2

## (undated) DEVICE — ZIMMER® STERILE DISPOSABLE TOURNIQUET CUFF WITH PLC, DUAL PORT, SINGLE BLADDER, 42 IN. (107 CM)

## (undated) DEVICE — DRAPE SHEET LG

## (undated) DEVICE — DRAPE ARTHROSCOPY KNEE

## (undated) DEVICE — ZIMMER® STERILE DISPOSABLE TOURNIQUET CUFF WITH PLC, DUAL PORT, SINGLE BLADDER, 30 IN. (76 CM)

## (undated) DEVICE — 20 ML SYRINGE LUER-LOCK TIP: Brand: MONOJECT

## (undated) DEVICE — SOL  .9 3000ML

## (undated) DEVICE — SUCTION CANISTER, 3000CC,SAFELINER: Brand: DEROYAL

## (undated) DEVICE — 1 ML INSULIN SYRINGE REGULAR LUER TIP: Brand: MONOJECT

## (undated) DEVICE — PEN: MARKING STD PT 100/CS: Brand: MEDICAL ACTION INDUSTRIES

## (undated) DEVICE — NON-ADHERENT STRIPS,OIL EMULSION: Brand: CURITY

## (undated) NOTE — LETTER
Krsitin Baugh, :1990    CONSENT FOR PROCEDURE/SEDATION    1. I authorize the performance upon Kristin Amauri  the following: Colposcopy with biopsy and Endocervical curettage    2.  I authorize Dr. Melanie Hall MD (and whomever is designated Relationship to patient: ____________________________________________    Witness: _________________________________________ Date:___________     Physician Signature: _______________________________ Date:___________

## (undated) NOTE — MR AVS SNAPSHOT
After Visit Summary   11/3/2020    Elizabeth Villalobos    MRN: [id number]           Visit Information     Date & Time  11/3/2020 11:00 AM Provider  Mary Resendiz, [facility], [address], [place]  Dept.  Phone  [phone] If you receive a survey from [employer], please take a few minutes to complete it and provide feedback. We strive to deliver the best patient experience and are looking for ways to make improvements. Your feedback will help us do so.  For more information EMERGENCY ROOM Life-threatening emergencies needing immediate intervention at a hospital emergency room.  Average cost  $2,300*   *Cost varies based on your insurance coverage  For more information about hours, locations or appointment options available at

## (undated) NOTE — LETTER
[facility] Department of OB/GYN  After Care Instructions for Colposcopy/Biopsy      Biopsy Results   You will receive a phone call with your biopsy results in 7 business days. If you have not received your biopsy results in 7 days, please contact our office.   Rosana Saldaña